# Patient Record
Sex: MALE | Race: WHITE | NOT HISPANIC OR LATINO | Employment: OTHER | URBAN - METROPOLITAN AREA
[De-identification: names, ages, dates, MRNs, and addresses within clinical notes are randomized per-mention and may not be internally consistent; named-entity substitution may affect disease eponyms.]

---

## 2017-01-17 ENCOUNTER — OFFICE VISIT (OUTPATIENT)
Dept: PAIN MEDICINE | Facility: CLINIC | Age: 42
End: 2017-01-17

## 2017-01-17 VITALS
HEART RATE: 89 BPM | DIASTOLIC BLOOD PRESSURE: 98 MMHG | SYSTOLIC BLOOD PRESSURE: 144 MMHG | HEIGHT: 69 IN | RESPIRATION RATE: 18 BRPM | TEMPERATURE: 97.7 F | WEIGHT: 236.4 LBS | OXYGEN SATURATION: 96 % | BODY MASS INDEX: 35.01 KG/M2

## 2017-01-17 DIAGNOSIS — G89.29 CHRONIC BILATERAL LOW BACK PAIN WITH LEFT-SIDED SCIATICA: Primary | ICD-10-CM

## 2017-01-17 DIAGNOSIS — M51.36 DDD (DEGENERATIVE DISC DISEASE), LUMBAR: ICD-10-CM

## 2017-01-17 DIAGNOSIS — M54.42 CHRONIC BILATERAL LOW BACK PAIN WITH LEFT-SIDED SCIATICA: Primary | ICD-10-CM

## 2017-01-17 PROCEDURE — 99213 OFFICE O/P EST LOW 20 MIN: CPT | Performed by: PAIN MEDICINE

## 2017-01-17 RX ORDER — HYDROCODONE BITARTRATE AND ACETAMINOPHEN 7.5; 325 MG/1; MG/1
1 TABLET ORAL 2 TIMES DAILY PRN
Qty: 60 TABLET | Refills: 0 | Status: SHIPPED | OUTPATIENT
Start: 2017-01-17 | End: 2017-03-17 | Stop reason: SDUPTHER

## 2017-01-17 RX ORDER — HYDROCODONE BITARTRATE AND ACETAMINOPHEN 7.5; 325 MG/1; MG/1
1 TABLET ORAL 2 TIMES DAILY PRN
Qty: 60 TABLET | Refills: 0 | Status: SHIPPED | OUTPATIENT
Start: 2017-01-17 | End: 2017-03-17

## 2017-01-17 RX ORDER — ROFLUMILAST 500 UG/1
500 TABLET ORAL DAILY
COMMUNITY
End: 2017-05-16

## 2017-01-17 NOTE — MR AVS SNAPSHOT
Jono Garza   1/17/2017 3:20 PM   Office Visit    Dept Phone:  [phone]   Encounter #:  [id number]    Provider:  Chante Duarte MD   Department:  [facility]IN [facility]NAG[facility]                Your Full Care Plan              Today's Medication Changes          These changes are accurate as of: 1/17/17  4:15 PM.  If you have any questions, ask your nurse or doctor.               New Medication(s)Ordered:     * HYDROcodone-acetaminophen 7.5-325 MG per tablet   Commonly known as:  NORCO   Take 1 tablet by mouth 2 (Two) Times a Day As Needed (pain).   Replaces:  HYDROcodone-acetaminophen 5-325 MG per tablet   Started by:  Chante Duarte MD       * HYDROcodone-acetaminophen 7.5-325 MG per tablet   Commonly known as:  NORCO   Take 1 tablet by mouth 2 (Two) Times a Day As Needed (pain).   Started by:  Chante Duarte MD       * Notice:  This list has 2 medication(s) that are the same as other medications prescribed for you. Read the directions carefully, and ask your doctor or other care provider to review them with you.      Stop taking medication(s)listed here:     HYDROcodone-acetaminophen 5-325 MG per tablet   Commonly known as:  NORCO   Replaced by:  HYDROcodone-acetaminophen 7.5-325 MG per tablet   Stopped by:  Chante Duarte MD           ibuprofen 600 MG tablet   Commonly known as:  ADVIL,MOTRIN   Stopped by:  Chante Duarte MD                Where to Get Your Medications      You can get these medications from any pharmacy     Bring a paper prescription for each of these medications     HYDROcodone-acetaminophen 7.5-325 MG per tablet    HYDROcodone-acetaminophen 7.5-325 MG per tablet                  Your Updated Medication List          This list is accurate as of: 1/17/17  4:15 PM.  Always use your most recent med list.                alendronate 5 MG tablet   Commonly known as:  FOSAMAX       amitriptyline 50 MG tablet      Commonly known as:  ELAVIL       budesonide-formoterol 160-4.5 MCG/ACT inhaler   Commonly known as:  SYMBICORT       diclofenac-misoprostol 50-0.2 MG EC tablet   Commonly known as:  ARTHROTEC 50   Take 1 tablet by mouth 2 (Two) Times a Day.       gabapentin 600 MG tablet   Commonly known as:  NEURONTIN       * HYDROcodone-acetaminophen 7.5-325 MG per tablet   Commonly known as:  NORCO   Take 1 tablet by mouth 2 (Two) Times a Day As Needed (pain).       * HYDROcodone-acetaminophen 7.5-325 MG per tablet   Commonly known as:  NORCO   Take 1 tablet by mouth 2 (Two) Times a Day As Needed (pain).       Loratadine 10 MG capsule       meloxicam 15 MG tablet   Commonly known as:  MOBIC       pramipexole 0.125 MG tablet   Commonly known as:  MIRAPEX       roflumilast 500 MCG tablet tablet   Commonly known as:  DALIRESP       SPIRIVA RESPIMAT IN       tapentadol 50 MG tablet   Commonly known as:  NUCYNTA   Take 1 tablet by mouth 2 (Two) Times a Day As Needed (pain).       VITAMIN D2 PO       * Notice:  This list has 2 medication(s) that are the same as other medications prescribed for you. Read the directions carefully, and ask your doctor or other care provider to review them with you.            You Were Diagnosed With        Codes Comments    Chronic bilateral low back pain with left-sided sciatica    -  Primary ICD-10-CM: M54.42, G89.29  ICD-9-CM: 724.2, 724.3, 338.29     DDD (degenerative disc disease), lumbar     ICD-10-CM: M51.36  ICD-9-CM: 722.52       Instructions     None    Patient Instructions History      Upcoming Appointments     Visit Type Date Time Department    OFFICE VISIT 1/17/2017  3:20 PM [facility]    NEW PATIENT 1/20/2017 10:00 AM [facility]      [employer] Signup     Our records indicate that you have an active [employer] account.    You can view your After Visit Summary by going to [email or web address] and logging in with your [employer] username and password.  If you don't  "have a [employer] username and password but a parent or guardian has access to your record, the parent or guardian should login with their own [employer] username and password and access your record to view the After Visit Summary.    If you have questions, you can email [email or web address] or call [phone] to talk to our [employer] staff.  Remember, [employer] is NOT to be used for urgent needs.  For medical emergencies, dial 911.               Other Info from Your Visit           Your Appointments     Jan 20, 2017 10:00 AM EST   New Patient with Juan Hopper MD   [facility] MEDICAL [facility] (--)    [address] [place] [address]   [phone]           Bring all previous medical records and films, along with current medications and insurance information.              Allergies     Acetaminophen      Penicillins        Reason for Visit     Back Pain           Vital Signs     Blood Pressure Pulse Temperature Respirations Height Weight    144/98 89 97.7 °F (36.5 °C) 18 69\" (175.3 cm) 236 lb 6.4 oz (107 kg)    Oxygen Saturation Body Mass Index Smoking Status             96% 34.91 kg/m2 Current Every Day Smoker         Problems and Diagnoses Noted     Chronic bilateral low back pain with left-sided sciatica    Degeneration of lumbar or lumbosacral intervertebral disc        "

## 2017-01-17 NOTE — PROGRESS NOTES
CHIEF COMPLAINT: Back Pain    Back Pain   This is a chronic problem. The current episode started more than 1 year ago. The problem occurs constantly. The problem has been waxing and waning since onset. The pain is present in the gluteal, lumbar spine and sacro-iliac. The quality of the pain is described as aching, burning, shooting and stabbing. The pain radiates to the left knee, left thigh, right knee and right thigh. The pain is at a severity of 7/10. The pain is worse during the day. The symptoms are aggravated by bending, position, sitting, standing and twisting. Stiffness is present all day. Associated symptoms include leg pain, paresthesias, tingling and weakness. Pertinent negatives include no abdominal pain, chest pain, headaches, perianal numbness or weight loss. Risk factors include history of osteoporosis and recent trauma.     Jono Garza is a 41 y.o. male.  He is here to follow up for Back Pain    Since last visit their pain has remain unchanged.  Patient has been released to the pain [place] for missing multiple pill counts.  New patient UDS was consistent so Nucynta was prescribed for the patient at last visit.  But even after a pills it was not approved by his insurance.  Hydrocodone 5/325 mg twice a day was started.  This is a significant decrease from his previous pain regimen of hydrocodone 10/325 mg 4 times a day.  States the medication has helped his pain and has helped him the, more functional and perform more daily activities, but pain is still present.    The patient states their pain is a 6 on a scale of 1-10.  The patient describes this pain as constant dull, ache, sharp, shooting, stabbing and throbbing.  The pain is located in Low Back and radiates to left leg. This painful problem is aggravated by physical activity, twisting, bending and lifting and is alleviated by relaxation, pain medication and heating pad.    DVD on SCS given at last apt but hasn't watched yet.     Past  pain medications: Hydrocodone 5/325 and 7.5/325 QID - not very effective  Hydrocodone 10/325 mg qid - effective - decrease pain level down to a 2-3. Able to be more active, perform more daily activities.   Mobic 15 mg daily - helping    Current pain medications: hydro/apap 5-325 mg BID  diclofenac-misoprostol 50-0.2 mg bid prn - only taken for 4-5 days- minimal help but doesn't hurt stomach when taking.   Gabapentin 600 mg TID - helping  Elavil 50 mg qhs - helping him sleep    Past therapies:  Physical Therapy: yes - last visit was over 1 year ago.   Chiropractor: yes  Massage Therapy: no  TENS: yes - used at PT and chiropractor  Neck or back surgery: no    Previous Injections: yes, in his lower back at [facility]- LESI x 3 - minimal benefit.   Effect of Injection (%): 0%, he said if it had any affect it did not last long.    PEG Assessment   What number best describes your pain on average in the past week? 6  What number best describes how, during the past week, pain has interfered with your enjoyment of life? 7  What number best describes how, during the past week, pain has interfered with your general activity? 8      Current Outpatient Prescriptions:   •  alendronate (FOSAMAX) 5 MG tablet, Take 5 mg by mouth Every Morning Before Breakfast., Disp: , Rfl:   •  amitriptyline (ELAVIL) 50 MG tablet, Take 50 mg by mouth Every Night., Disp: , Rfl:   •  budesonide-formoterol (SYMBICORT) 160-4.5 MCG/ACT inhaler, Inhale 1 puff 2 (Two) Times a Day., Disp: , Rfl:   •  diclofenac-misoprostol (ARTHROTEC 50) 50-0.2 MG EC tablet, Take 1 tablet by mouth 2 (Two) Times a Day., Disp: 60 tablet, Rfl: 3  •  Ergocalciferol (VITAMIN D2 PO), Take 125 mg by mouth., Disp: , Rfl:   •  gabapentin (NEURONTIN) 600 MG tablet, Take 600 mg by mouth 3 (Three) Times a Day., Disp: , Rfl:   •  Loratadine 10 MG capsule, Take  by mouth., Disp: , Rfl:   •  meloxicam (MOBIC) 15 MG tablet, Take 15 mg by mouth Daily., Disp: , Rfl:   •   pramipexole (MIRAPEX) 0.125 MG tablet, Take 0.125 mg by mouth 3 (Three) Times a Day., Disp: , Rfl:   •  roflumilast (DALIRESP) 500 MCG tablet tablet, Take 500 mcg by mouth Daily., Disp: , Rfl:   •  Tiotropium Bromide Monohydrate (SPIRIVA RESPIMAT IN), Inhale 1 puff., Disp: , Rfl:   •  tapentadol (NUCYNTA) 50 MG tablet, Take 1 tablet by mouth 2 (Two) Times a Day As Needed (pain)., Disp: 60 tablet, Rfl: 0    IMAGING  MRI femur 7/27/16  IMPRESSION: Normal MRI of the thigh and femur. Incidental benign lesion noted in the distal femoral shaft as detailed above.       MRI lumbar spine 3/15/16 - in [place] - no results posted - patient states he had it performed, will try to obtain.  Summary from the [facility] note the lumbar MRI showed:  1. Old superior L1 vertebral body compression fracture no associated retropulsion of vertebral body  2. Mild T12-L1 disc degeneration.  3. Mild L5-S1 disc degeneration producing mild right-sided stenosis      Mri 12/2014:  COMMENT: MRI of the lumbar spine performed without contrast using  routine 1.5T imaging technique. The outside CT scan from 03/28/2014 has  been acquired for comparison.  Sagittal alignment is normal. Chronic mild anterior wedging is seen at  L1 and to a lesser extent at T12 with large Schmorl's nodes at the  superior endplates. The conus medullaris terminates at L1-2 and is  within normal limits. There is mild intervertebral disk desiccation at  T11-12, T12-L1 and L5-S1 with mild associated loss of intervertebral  disk height. There is a small posterior annular fissure at the 5-1  level. There is some marrow signal abnormality seen at the right side  of the sacrum S1 level. This corresponds to the site of the fracture on  the study from 03/28/2014 and is probably related to fracture healing.  Please refer to separate MRI pelvis report. CT scanning is more  sensitive than MRI for discrete fracture line. Otherwise, marrow signal  intensity is  unremarkable.  At T12-L1, there is mild concentric disk bulge with mild effacement of  the anterior thecal sac. There is no foraminal impingement.  At L1-2, there is no significant abnormality. No canal or foraminal  impingement.  At L2-3, no significant abnormality.  At L3-4, mild facet hypertrophy. No canal or foraminal compromise.  At L4-5, mild facet degenerative change bilaterally, left more so than  right. No canal stenosis or foraminal impingement.  At L5-S1, mild facet degenerative change bilaterally. Mild broad-based  posterior disk bulge with a superimposed extrusion in a right paramedian  location extending slightly caudad from the level of the disk resulting  contiguous with it resulting in mild mass effect on the right lateral  recess, expected location of the right S1 root. There is however no  significant central canal stenosis. There is mild right-sided foraminal  narrowing and only minimal left foraminal narrowing.  IMPRESSION-  1. There is some marrow edema in the right sacral ala at site of  previously diagnosed fracture. This is probably related to fracture  healing, but please correlate with the pelvic MRI and please be aware  that CT scanning is more sensitive than MRI for assessment of the  fracture line itself.  2. At the L5-S1 level, there is a rightward disk extrusion with some  mild mass effect on the right lateral recess expected location of the  right S1 root, but there is no central canal stenosis. Please refer to  the multiple level description and level-by-level discussion above.  3. Chronic anterior wedging at T12 and L1. No acute compression  Fracture.      Lumbar x-rays with flexion extension views 3/31/2016:  1. Stable chronic mild superior L1 vertebral body compression deformity, no acute fracture   2. New amount posterior L5 disc narrowing likely due to early degenerative change  3. Diffuse osteopenia greater than expected for age  4. Decreased range of motion      EMG on  "3/31/2016:  1. Normal study    The following portions of the patient's history were reviewed and updated as appropriate: allergies, current medications, past family history, past medical history, past social history, past surgical history and problem list.    Review of Systems   Constitutional: Negative for fatigue and weight loss.   HENT: Positive for congestion. Negative for tinnitus.    Eyes: Negative for visual disturbance.   Respiratory: Positive for cough, shortness of breath and wheezing.    Cardiovascular: Negative for chest pain.   Gastrointestinal: Negative for abdominal pain, constipation and diarrhea.   Musculoskeletal: Positive for back pain.   Skin: Negative for rash and wound.   Allergic/Immunologic: Negative for immunocompromised state.   Neurological: Positive for tingling, weakness and paresthesias. Negative for headaches.   Hematological: Does not bruise/bleed easily.   Psychiatric/Behavioral: Positive for decreased concentration, dysphoric mood and sleep disturbance. Negative for suicidal ideas. The patient is not nervous/anxious.    All other systems reviewed and are negative.      Vitals:    01/17/17 1506   BP: 144/98   Pulse: 89   Resp: 18   Temp: 97.7 °F (36.5 °C)   SpO2: 96%   Weight: 236 lb 6.4 oz (107 kg)   Height: 69\" (175.3 cm)   PainSc:   6   PainLoc: Back       Physical Exam   Constitutional: He is oriented to person, place, and time. He appears well-developed and well-nourished. No distress.   HENT:   Head: Normocephalic and atraumatic.   Nose: Nose normal.   Mouth/Throat: Oropharynx is clear and moist.   Eyes: Conjunctivae and EOM are normal.   Neck: Normal range of motion. Neck supple.   Pulmonary/Chest: Effort normal. No stridor. No respiratory distress.   Musculoskeletal:        Right hip: He exhibits decreased range of motion, decreased strength and tenderness.        Left hip: He exhibits decreased range of motion, decreased strength and tenderness.        Lumbar back: He " exhibits decreased range of motion, tenderness, pain and spasm.   +bilateral lumbar facet tenderness  +bilateral lumbar facet loading   Patient is hyperalgesic over lumbar and pelvic region.  Extremely sensitive to light touch making it difficult to examine.  Out of proportion to exam.    Neurological: He is alert and oriented to person, place, and time. He has normal strength. No cranial nerve deficit or sensory deficit. Gait normal.   Skin: Skin is warm and dry. No rash noted. He is not diaphoretic.   Psychiatric: He has a normal mood and affect. His speech is normal and behavior is normal.   Nursing note and vitals reviewed.    Ortho Exam  Neurologic Exam     Mental Status   Oriented to person, place, and time.   Speech: speech is normal     Cranial Nerves   Cranial nerves II through XII intact.     CN III, IV, VI   Extraocular motions are normal.     Motor Exam     Strength   Strength 5/5 throughout.     Gait, Coordination, and Reflexes     Gait  Gait: normal      Pain Management Panel     Pain Management Panel Latest Ref Rng 11/29/2016    AMPHETAMINES SCREEN, URINE Negative Negative    BARBITURATES SCREEN Negative Negative    BENZODIAZEPINE SCREEN, URINE Negative Negative    COCAINE SCREEN, URINE Negative Negative    METHADONE SCREEN, URINE Negative Negative        Last UDS: 11/29/2016  Comments: consistent- +gabapentin +elavil-          Date of last LUISA reviewed : 01/17/17   Comments: Consistent     Assessment/Plan   Jono was seen today for back pain.    Diagnoses and all orders for this visit:    Chronic bilateral low back pain with left-sided sciatica    DDD (degenerative disc disease), lumbar    Requested Prescriptions     Signed Prescriptions Disp Refills   • HYDROcodone-acetaminophen (NORCO) 7.5-325 MG per tablet 60 tablet 0     Sig: Take 1 tablet by mouth 2 (Two) Times a Day As Needed (pain).   • HYDROcodone-acetaminophen (NORCO) 7.5-325 MG per tablet 60 tablet 0     Sig: Take 1 tablet by mouth  "2 (Two) Times a Day As Needed (pain).     -  Random urine drug screen per office policy AT NEXT VISIT.  -Insurance did not approve Nucynta.- \"patient needs to try and/or fail or have side affects of oxycodone, oxycodone-acetaminophen, tramadol, or hydromorphone\"  - Given the patient had been dismissed from the pain [place] I was trying to give her less abuse narcotic.    - We'll continue to closely monitor the patient for any abuse or misuse.  - DVD given to the patient about SCS at last visit.  Encouraged to watch.  Patient states he has a lot going on socially in his life at this time that he cannot consider this in the next few months.  We'll address in the future.  - Referral to orthospine placed 11/29/2016 for further evaluation given continued severe low back and pelvic pain out of proportion to exam.  The patient has never been evaluated by a surgeon in the past.  His only diagnosis is continued pain after multiple pelvic fractures.  His pain should've continued to heal over the years but given his extreme pain should be evaluated to make sure there is not anything concerning from a surgical aspects.  - Will check on ortho spine referral status.   - continue diclofenac-misoprostol 50-0.2 mg. No refills needed today.    - Continue gabapentin 600 mg tid - helping, No refills needed today.   - Continue elavil 50 mg qhs - helping, no refills needed today.   - Will not restart his high dose narcotics as they were ineffective and given his age and chronicity of his disease I would like to keep his narcotics minimal.   - Will increase his hydrocodone from 5 mg to 7.5-325 mg twice a day when necessary pain.  We will increase for 2 months only with plan to decrease back to 5/325 twice a day when necessary after 2 months.  However the patient remain on minimal narcotics possible giving his young age and ineffectiveness at high doses in the past.  - Patient instructed regarding side effects and the need to avoid " driving until they know how the medication changes affect them.     - Two months prescriptions given today. Appropriate DNF dates applied.    - Continue home exercise program.      - Will be on vacation from march 24- April 1st.  We will follow up before his vacation but no early refills will be given.  Appropriate do not fill dates will be applied.    - Body mass index is 34.91 kg/(m^2). By CDC definitions, this patient is obese (BMI >= 30). Patient counseled on the importance of weight loss to help with overall health and pain control. Patient instructed to attempt weight loss.   weight loss of 6 lbs over the past 1 month(s)    Intentional?  Yes  Plan: Calorie counting  increase physical activity, reduce portion size, cut out extra servings and reduce fast food intake    Follow-up in 2 months.    LUISA REPORT  As part of the patient's treatment plan, I am prescribing controlled substances. The patient has been made aware of appropriate use of such medications, including potential risk of somnolence, limited ability to drive and/or work safely, and the potential for dependence or overdose. It has also bee made clear that these medications are for use by this patient only, without concomitant use of alcohol or other substances unless prescribed.     Patient has completed prescribing agreement detailing terms of continued prescribing of controlled substances, including monitoring LUISA reports, urine drug screening, and pill counts if necessary. The patient is aware that inappropriate use will results in cessation of prescribing such medications.    LUISA report has been reviewed and scanned into the patient's chart.    History and physical exam exhibit continued safe and appropriate use of controlled substances.       Chante Duarte MD  Pain Management

## 2017-01-18 ENCOUNTER — TELEPHONE (OUTPATIENT)
Dept: PAIN MEDICINE | Facility: CLINIC | Age: 42
End: 2017-01-18

## 2017-01-18 NOTE — TELEPHONE ENCOUNTER
Patients insurance denied Nucynta and states that patient needs to try and/or fail or have side affects of oxycodone, oxycodone-acetaminophen, tramadol, or hydromorphone

## 2017-03-17 ENCOUNTER — OFFICE VISIT (OUTPATIENT)
Dept: PAIN MEDICINE | Facility: CLINIC | Age: 42
End: 2017-03-17

## 2017-03-17 VITALS
RESPIRATION RATE: 16 BRPM | WEIGHT: 241.2 LBS | HEIGHT: 69 IN | BODY MASS INDEX: 35.73 KG/M2 | DIASTOLIC BLOOD PRESSURE: 93 MMHG | SYSTOLIC BLOOD PRESSURE: 151 MMHG | TEMPERATURE: 97.7 F | OXYGEN SATURATION: 95 % | HEART RATE: 111 BPM

## 2017-03-17 DIAGNOSIS — M54.42 CHRONIC BILATERAL LOW BACK PAIN WITH LEFT-SIDED SCIATICA: ICD-10-CM

## 2017-03-17 DIAGNOSIS — G89.29 CHRONIC BILATERAL LOW BACK PAIN WITH LEFT-SIDED SCIATICA: ICD-10-CM

## 2017-03-17 DIAGNOSIS — M51.36 DDD (DEGENERATIVE DISC DISEASE), LUMBAR: ICD-10-CM

## 2017-03-17 PROCEDURE — 99214 OFFICE O/P EST MOD 30 MIN: CPT | Performed by: PAIN MEDICINE

## 2017-03-17 RX ORDER — HYDROCODONE BITARTRATE AND ACETAMINOPHEN 7.5; 325 MG/1; MG/1
1 TABLET ORAL 3 TIMES DAILY PRN
Qty: 90 TABLET | Refills: 0 | Status: SHIPPED | OUTPATIENT
Start: 2017-03-17 | End: 2017-05-16 | Stop reason: SDUPTHER

## 2017-03-17 RX ORDER — HYDROCODONE BITARTRATE AND ACETAMINOPHEN 7.5; 325 MG/1; MG/1
1 TABLET ORAL 3 TIMES DAILY PRN
Qty: 90 TABLET | Refills: 0 | Status: SHIPPED | OUTPATIENT
Start: 2017-03-17 | End: 2017-03-17 | Stop reason: SDUPTHER

## 2017-03-17 RX ORDER — HYDROCODONE BITARTRATE AND ACETAMINOPHEN 7.5; 325 MG/1; MG/1
1 TABLET ORAL 3 TIMES DAILY PRN
Qty: 90 TABLET | Refills: 0 | Status: SHIPPED | OUTPATIENT
Start: 2017-03-17 | End: 2017-05-16

## 2017-03-17 NOTE — PROGRESS NOTES
CHIEF COMPLAINT: Back Pain    HPI  Jono Garza is a 41 y.o. male.  He is here to follow up for Back Pain    Since last visit their pain has remain unchanged. Pt state he has been trying to be more active. Increased pain with increased activity. Trying to walk his dogs more but causing increased pain.     The patient states their pain is a 5 on a scale of 1-10. The patient describes this pain as constant dull, ache, sharp, shooting, stabbing and throbbing. The pain is located in Low Back and radiates to left leg. This painful problem is aggravated by physical activity, twisting, bending and lifting and is alleviated by relaxation, pain medication and heating pad.    Past pain medications: Hydrocodone 5/325 and 7.5/325 QID - not very effective  Hydrocodone 10/325 mg qid - effective - decrease pain level down to a 2-3. Able to be more active, perform more daily activities.   Mobic 15 mg daily - helping     Current pain medications: hydro/apap 5-325 mg BID  diclofenac-misoprostol 50-0.2 mg bid prn - only taken for 4-5 days- minimal help but doesn't hurt stomach when taking.   Gabapentin 600 mg TID - helping  Elavil 50 mg qhs - helping him sleep     Past therapies:  Physical Therapy: yes - last visit was over 1 year ago.   Chiropractor: yes  Massage Therapy: no  TENS: yes - used at PT and chiropractor  Neck or back surgery: no    PEG Assessment   What number best describes your pain on average in the past week? 5  What number best describes how, during the past week, pain has interfered with your enjoyment of life? 4  What number best describes how, during the past week, pain has interfered with your general activity? 4      Current Outpatient Prescriptions:   •  alendronate (FOSAMAX) 5 MG tablet, Take 5 mg by mouth Every Morning Before Breakfast., Disp: , Rfl:   •  amitriptyline (ELAVIL) 50 MG tablet, Take 50 mg by mouth Every Night., Disp: , Rfl:   •  budesonide-formoterol (SYMBICORT) 160-4.5 MCG/ACT  inhaler, Inhale 1 puff 2 (Two) Times a Day., Disp: , Rfl:   •  diclofenac-misoprostol (ARTHROTEC 50) 50-0.2 MG EC tablet, Take 1 tablet by mouth 2 (Two) Times a Day., Disp: 60 tablet, Rfl: 3  •  Ergocalciferol (VITAMIN D2 PO), Take 125 mg by mouth., Disp: , Rfl:   •  gabapentin (NEURONTIN) 600 MG tablet, Take 600 mg by mouth 3 (Three) Times a Day., Disp: , Rfl:   •  HYDROcodone-acetaminophen (NORCO) 7.5-325 MG per tablet, Take 1 tablet by mouth 3 (Three) Times a Day As Needed (pain)., Disp: 90 tablet, Rfl: 0  •  Loratadine 10 MG capsule, Take  by mouth., Disp: , Rfl:   •  meloxicam (MOBIC) 15 MG tablet, Take 15 mg by mouth Daily., Disp: , Rfl:   •  pramipexole (MIRAPEX) 0.125 MG tablet, Take 0.125 mg by mouth 3 (Three) Times a Day., Disp: , Rfl:   •  roflumilast (DALIRESP) 500 MCG tablet tablet, Take 500 mcg by mouth Daily., Disp: , Rfl:   •  tapentadol (NUCYNTA) 50 MG tablet, Take 1 tablet by mouth 2 (Two) Times a Day As Needed (pain)., Disp: 60 tablet, Rfl: 0  •  theophylline (STEPHANIA-24) 300 MG 24 hr capsule, Take 150 mg by mouth 2 (Two) Times a Day., Disp: , Rfl:   •  Tiotropium Bromide Monohydrate (SPIRIVA RESPIMAT IN), Inhale 1 puff., Disp: , Rfl:   •  HYDROcodone-acetaminophen (NORCO) 7.5-325 MG per tablet, Take 1 tablet by mouth 3 (Three) Times a Day As Needed (pain)., Disp: 90 tablet, Rfl: 0    IMAGING  MRI femur 7/27/16  IMPRESSION: Normal MRI of the thigh and femur. Incidental benign lesion noted in the distal femoral shaft as detailed above.       MRI lumbar spine 3/15/16 - in [place] - no results posted - patient states he had it performed, will try to obtain.  Summary from the [facility] note the lumbar MRI showed:  1. Old superior L1 vertebral body compression fracture no associated retropulsion of vertebral body  2. Mild T12-L1 disc degeneration.  3. Mild L5-S1 disc degeneration producing mild right-sided stenosis      Mri 12/2014:  COMMENT: MRI of the lumbar spine performed without  contrast using  routine 1.5T imaging technique. The outside CT scan from 03/28/2014 has  been acquired for comparison.  Sagittal alignment is normal. Chronic mild anterior wedging is seen at  L1 and to a lesser extent at T12 with large Schmorl's nodes at the  superior endplates. The conus medullaris terminates at L1-2 and is  within normal limits. There is mild intervertebral disk desiccation at  T11-12, T12-L1 and L5-S1 with mild associated loss of intervertebral  disk height. There is a small posterior annular fissure at the 5-1  level. There is some marrow signal abnormality seen at the right side  of the sacrum S1 level. This corresponds to the site of the fracture on  the study from 03/28/2014 and is probably related to fracture healing.  Please refer to separate MRI pelvis report. CT scanning is more  sensitive than MRI for discrete fracture line. Otherwise, marrow signal  intensity is unremarkable.  At T12-L1, there is mild concentric disk bulge with mild effacement of  the anterior thecal sac. There is no foraminal impingement.  At L1-2, there is no significant abnormality. No canal or foraminal  impingement.  At L2-3, no significant abnormality.  At L3-4, mild facet hypertrophy. No canal or foraminal compromise.  At L4-5, mild facet degenerative change bilaterally, left more so than  right. No canal stenosis or foraminal impingement.  At L5-S1, mild facet degenerative change bilaterally. Mild broad-based  posterior disk bulge with a superimposed extrusion in a right paramedian  location extending slightly caudad from the level of the disk resulting  contiguous with it resulting in mild mass effect on the right lateral  recess, expected location of the right S1 root. There is however no  significant central canal stenosis. There is mild right-sided foraminal  narrowing and only minimal left foraminal narrowing.  IMPRESSION-  1. There is some marrow edema in the right sacral ala at site of  previously  diagnosed fracture. This is probably related to fracture  healing, but please correlate with the pelvic MRI and please be aware  that CT scanning is more sensitive than MRI for assessment of the  fracture line itself.  2. At the L5-S1 level, there is a rightward disk extrusion with some  mild mass effect on the right lateral recess expected location of the  right S1 root, but there is no central canal stenosis. Please refer to  the multiple level description and level-by-level discussion above.  3. Chronic anterior wedging at T12 and L1. No acute compression  Fracture.      Lumbar x-rays with flexion extension views 3/31/2016:  1. Stable chronic mild superior L1 vertebral body compression deformity, no acute fracture   2. New amount posterior L5 disc narrowing likely due to early degenerative change  3. Diffuse osteopenia greater than expected for age  4. Decreased range of motion      EMG on 3/31/2016:  1. Normal study    PFSH:  The following portions of the patient's history were reviewed and updated as appropriate: problem list, past medical history, past surgery history, social history, family history, medications, and allergies    Review of Systems   Constitutional: Positive for activity change (increased). Negative for fever.   Respiratory: Positive for cough. Negative for shortness of breath.    Cardiovascular: Negative for chest pain.   Gastrointestinal: Negative for abdominal pain, constipation, diarrhea, nausea and vomiting.   Genitourinary: Negative for difficulty urinating and dysuria.   Musculoskeletal: Positive for back pain.   Neurological: Positive for weakness and numbness. Negative for dizziness, light-headedness and headaches.   Psychiatric/Behavioral: Positive for sleep disturbance. Negative for agitation, confusion, hallucinations and suicidal ideas.   All other systems reviewed and are negative.      Vitals:    03/17/17 0809   BP: 151/93   Pulse: 111   Resp: 16   Temp: 97.7 °F (36.5 °C)   SpO2:  "95%   Weight: 241 lb 3.2 oz (109 kg)   Height: 69\" (175.3 cm)   PainSc:   5   PainLoc: Back       Physical Exam   Constitutional: He is oriented to person, place, and time. He appears well-developed and well-nourished. No distress.   HENT:   Head: Normocephalic and atraumatic.   Nose: Nose normal.   Mouth/Throat: Oropharynx is clear and moist.   Eyes: Conjunctivae and EOM are normal.   Neck: Normal range of motion. Neck supple.   Pulmonary/Chest: Effort normal. No stridor. No respiratory distress.   Musculoskeletal:        Right hip: He exhibits decreased range of motion, decreased strength and tenderness.        Left hip: He exhibits decreased range of motion, decreased strength and tenderness.        Lumbar back: He exhibits decreased range of motion, tenderness, pain and spasm.   +bilateral lumbar facet tenderness  +bilateral lumbar facet loading   Patient is hyperalgesic over lumbar and pelvic region.  Extremely sensitive to light touch making it difficult to examine.  Out of proportion to exam.    Neurological: He is alert and oriented to person, place, and time. He has normal strength. No cranial nerve deficit or sensory deficit.   Skin: Skin is warm and dry. No rash noted. He is not diaphoretic.   Psychiatric: He has a normal mood and affect. His speech is normal and behavior is normal.   Nursing note and vitals reviewed.    Ortho Exam  Neurologic Exam     Mental Status   Oriented to person, place, and time.   Speech: speech is normal     Cranial Nerves     CN III, IV, VI   Extraocular motions are normal.     Motor Exam     Strength   Strength 5/5 throughout.       Lab Results   Component Value Date    POCMETH Negative 11/29/2016    POCAMPHET Negative 11/29/2016    POCBARBITUR Negative 11/29/2016    POCBENZO Negative 11/29/2016    POCCOCAINE Negative 11/29/2016    POCMETHADO Negative 11/29/2016    POCOPIATES Negative 11/29/2016    POCOXYCODO Negative 11/29/2016    POCPHENCYC Negative 11/29/2016    POCPROPOXY " Negative 11/29/2016    POCTHC Negative 11/29/2016    POCTRICYC Positive (A) 11/29/2016     Last UDS results reviewed: 03/17/17   Last UDS: 11/29/2016  Comments: consistent- +gabapentin +elavil-       Date of last LUISA reviewed : 03/17/17   Comments: Consistent     Assessment/Plan   Jono was seen today for back pain.    Diagnoses and all orders for this visit:    Chronic bilateral low back pain with left-sided sciatica  -     Discontinue: HYDROcodone-acetaminophen (NORCO) 7.5-325 MG per tablet; Take 1 tablet by mouth 3 (Three) Times a Day As Needed (pain).  -     HYDROcodone-acetaminophen (NORCO) 7.5-325 MG per tablet; Take 1 tablet by mouth 3 (Three) Times a Day As Needed (pain).  -     HYDROcodone-acetaminophen (NORCO) 7.5-325 MG per tablet; Take 1 tablet by mouth 3 (Three) Times a Day As Needed (pain).  -     Oral Fluid Drug Screen; Future  -     Cancel: Oral Fluid Drug Screen; Future    DDD (degenerative disc disease), lumbar  -     Discontinue: HYDROcodone-acetaminophen (NORCO) 7.5-325 MG per tablet; Take 1 tablet by mouth 3 (Three) Times a Day As Needed (pain).  -     HYDROcodone-acetaminophen (NORCO) 7.5-325 MG per tablet; Take 1 tablet by mouth 3 (Three) Times a Day As Needed (pain).  -     HYDROcodone-acetaminophen (NORCO) 7.5-325 MG per tablet; Take 1 tablet by mouth 3 (Three) Times a Day As Needed (pain).  -     Oral Fluid Drug Screen; Future  -     Cancel: Oral Fluid Drug Screen; Future    Requested Prescriptions     Signed Prescriptions Disp Refills   • HYDROcodone-acetaminophen (NORCO) 7.5-325 MG per tablet 90 tablet 0     Sig: Take 1 tablet by mouth 3 (Three) Times a Day As Needed (pain).   • HYDROcodone-acetaminophen (NORCO) 7.5-325 MG per tablet 90 tablet 0     Sig: Take 1 tablet by mouth 3 (Three) Times a Day As Needed (pain).     - UDS done today, will check on next visit.   - INCREASE norco from 7.5/325 mg bid to tid prn pain. (#90, 1 refills)   - Two months prescriptions given today.  "Appropriate DNF dates applied.    - Insurance did not approve Nucynta.- \"patient needs to try and/or fail or have side affects of oxycodone, oxycodone-acetaminophen, tramadol, or hydromorphone\"  - Given the patient had been dismissed from the pain [facility] I was trying to give her less abuse narcotic. We'll continue to closely monitor the patient for any abuse or misuse.  - DVD given to the patient about SCS at last visit. We'll address in the future.  - Referral to orthospine placed 11/29/2016 for further evaluation given continued severe low back and pelvic pain out of proportion to exam.  The patient has never been evaluated by a surgeon in the past. His only diagnosis is continued pain after multiple pelvic fractures. His pain should've continued to heal over the years but given his extreme pain should be evaluated to make sure there is not anything concerning from a surgical aspects.  - Will check on ortho spine referral status.   - continue diclofenac-misoprostol 50-0.2 mg. No refills needed today.   - Continue gabapentin 600 mg tid - helping, No refills needed today.   - Continue elavil 50 mg qhs - helping, no refills needed today.   - Will not restart his high dose narcotics as they were ineffective and given his age and chronicity of his disease I would like to keep his narcotics minimal.   - Patient instructed regarding side effects and the need to avoid driving until they know how the medication changes affect them.     - Continue home exercise program.       Wt Readings from Last 3 Encounters:   03/17/17 241 lb 3.2 oz (109 kg)   01/17/17 236 lb 6.4 oz (107 kg)   12/14/16 242 lb (110 kg)     Body mass index is 35.62 kg/(m^2). Patient counseled on the importance of weight loss to help with overall health and pain control. Patient instructed to attempt weight loss.  Plan: Calorie counting  reduce portion size, cut out extra servings and reduce fast food intake Increase walking dog, more frequently and " longer walks.     Follow-up in 2 months.    LUISA REPORT  As part of the patient's treatment plan, I am prescribing controlled substances. The patient has been made aware of appropriate use of such medications, including potential risk of somnolence, limited ability to drive and/or work safely, and the potential for dependence or overdose. It has also bee made clear that these medications are for use by this patient only, without concomitant use of alcohol or other substances unless prescribed.     Patient has completed prescribing agreement detailing terms of continued prescribing of controlled substances, including monitoring LUISA reports, urine drug screening, and pill counts if necessary. The patient is aware that inappropriate use will results in cessation of prescribing such medications.    LUISA report has been reviewed and scanned into the patient's chart.    History and physical exam exhibit continued safe and appropriate use of controlled substances.     Chante Duarte MD  Pain Management

## 2017-05-06 DIAGNOSIS — K31.89 STOMACH IRRITATION: ICD-10-CM

## 2017-05-06 DIAGNOSIS — G89.29 CHRONIC BILATERAL LOW BACK PAIN WITH LEFT-SIDED SCIATICA: ICD-10-CM

## 2017-05-06 DIAGNOSIS — M54.42 CHRONIC BILATERAL LOW BACK PAIN WITH LEFT-SIDED SCIATICA: ICD-10-CM

## 2017-05-06 DIAGNOSIS — M51.36 DDD (DEGENERATIVE DISC DISEASE), LUMBAR: ICD-10-CM

## 2017-05-08 RX ORDER — DICLOFENAC SODIUM AND MISOPROSTOL 50; 200 MG/1; UG/1
TABLET, DELAYED RELEASE ORAL
Qty: 60 TABLET | Refills: 2 | Status: SHIPPED | OUTPATIENT
Start: 2017-05-08 | End: 2017-08-29 | Stop reason: SDUPTHER

## 2017-05-16 ENCOUNTER — OFFICE VISIT (OUTPATIENT)
Dept: PAIN MEDICINE | Facility: CLINIC | Age: 42
End: 2017-05-16

## 2017-05-16 VITALS
SYSTOLIC BLOOD PRESSURE: 122 MMHG | DIASTOLIC BLOOD PRESSURE: 82 MMHG | TEMPERATURE: 97.8 F | OXYGEN SATURATION: 96 % | RESPIRATION RATE: 18 BRPM | HEIGHT: 69 IN | HEART RATE: 96 BPM

## 2017-05-16 DIAGNOSIS — M54.42 CHRONIC BILATERAL LOW BACK PAIN WITH LEFT-SIDED SCIATICA: ICD-10-CM

## 2017-05-16 DIAGNOSIS — M54.41 CHRONIC BILATERAL LOW BACK PAIN WITH BILATERAL SCIATICA: ICD-10-CM

## 2017-05-16 DIAGNOSIS — M51.36 DDD (DEGENERATIVE DISC DISEASE), LUMBAR: ICD-10-CM

## 2017-05-16 DIAGNOSIS — G89.29 CHRONIC BILATERAL LOW BACK PAIN WITH BILATERAL SCIATICA: ICD-10-CM

## 2017-05-16 DIAGNOSIS — G89.29 CHRONIC BILATERAL LOW BACK PAIN WITH LEFT-SIDED SCIATICA: ICD-10-CM

## 2017-05-16 DIAGNOSIS — M54.42 CHRONIC BILATERAL LOW BACK PAIN WITH BILATERAL SCIATICA: ICD-10-CM

## 2017-05-16 PROCEDURE — 99213 OFFICE O/P EST LOW 20 MIN: CPT | Performed by: PAIN MEDICINE

## 2017-05-16 RX ORDER — HYDROCODONE BITARTRATE AND ACETAMINOPHEN 7.5; 325 MG/1; MG/1
1 TABLET ORAL 3 TIMES DAILY PRN
Qty: 90 TABLET | Refills: 0 | Status: SHIPPED | OUTPATIENT
Start: 2017-05-16 | End: 2017-07-14 | Stop reason: SDUPTHER

## 2017-05-18 PROBLEM — M43.06 LUMBAR SPONDYLOLYSIS: Status: ACTIVE | Noted: 2017-05-18

## 2017-07-14 ENCOUNTER — OFFICE VISIT (OUTPATIENT)
Dept: PAIN MEDICINE | Facility: CLINIC | Age: 42
End: 2017-07-14

## 2017-07-14 VITALS
HEIGHT: 69 IN | WEIGHT: 226.8 LBS | BODY MASS INDEX: 33.59 KG/M2 | SYSTOLIC BLOOD PRESSURE: 136 MMHG | DIASTOLIC BLOOD PRESSURE: 85 MMHG | OXYGEN SATURATION: 97 % | TEMPERATURE: 97.7 F | HEART RATE: 107 BPM | RESPIRATION RATE: 16 BRPM

## 2017-07-14 DIAGNOSIS — M54.41 CHRONIC BILATERAL LOW BACK PAIN WITH BILATERAL SCIATICA: ICD-10-CM

## 2017-07-14 DIAGNOSIS — M51.36 DDD (DEGENERATIVE DISC DISEASE), LUMBAR: ICD-10-CM

## 2017-07-14 DIAGNOSIS — G89.29 CHRONIC BILATERAL LOW BACK PAIN WITH LEFT-SIDED SCIATICA: Primary | ICD-10-CM

## 2017-07-14 DIAGNOSIS — M54.42 CHRONIC BILATERAL LOW BACK PAIN WITH BILATERAL SCIATICA: ICD-10-CM

## 2017-07-14 DIAGNOSIS — G89.29 CHRONIC BILATERAL LOW BACK PAIN WITH BILATERAL SCIATICA: ICD-10-CM

## 2017-07-14 DIAGNOSIS — M54.42 CHRONIC BILATERAL LOW BACK PAIN WITH LEFT-SIDED SCIATICA: Primary | ICD-10-CM

## 2017-07-14 DIAGNOSIS — M43.06 LUMBAR SPONDYLOLYSIS: ICD-10-CM

## 2017-07-14 LAB
POC AMPHETAMINES: NEGATIVE
POC BARBITURATES: NEGATIVE
POC BENZODIAZEPHINES: NEGATIVE
POC COCAINE: NEGATIVE
POC METHADONE: NEGATIVE
POC METHAMPHETAMINE SCREEN URINE: NEGATIVE
POC OPIATES: POSITIVE
POC OXYCODONE: POSITIVE
POC PHENCYCLIDINE: NEGATIVE
POC PROPOXYPHENE: NEGATIVE
POC THC: NEGATIVE
POC TRICYCLIC ANTIDEPRESSANTS: POSITIVE

## 2017-07-14 PROCEDURE — 80305 DRUG TEST PRSMV DIR OPT OBS: CPT | Performed by: PAIN MEDICINE

## 2017-07-14 PROCEDURE — 99213 OFFICE O/P EST LOW 20 MIN: CPT | Performed by: PAIN MEDICINE

## 2017-07-14 RX ORDER — HYDROCODONE BITARTRATE AND ACETAMINOPHEN 7.5; 325 MG/1; MG/1
1 TABLET ORAL 3 TIMES DAILY PRN
Qty: 90 TABLET | Refills: 0 | Status: SHIPPED | OUTPATIENT
Start: 2017-07-14 | End: 2017-09-14 | Stop reason: SDUPTHER

## 2017-07-14 NOTE — PROGRESS NOTES
CHIEF COMPLAINT: Back Pain    HPI  Jono Garza is a 41 y.o. male.  He is here to follow up for Back Pain    Since last visit their pain has remain unchanged. Pt has been sick with multi-focal PNA over the last month so he was in the hospital for a few days and on abx. States he had a CT and they found a growth/nodule on lungs and swollen lymph nodes so he has to see a pulmonologist soon. States he wants to quit smoking but has not tried something that will work for him to help him quit. He has weaned down the amount of cigarettes he has smoked. He has not seen spine specialist because he was dealing with and still is dealing with the PNA. He said the back brace has been helping his leg a lot, keeping it from going numb and burning.     The patient states their pain is a 4 on a scale of 1-10.  The patient describes this pain as constant dull, ache, sharp, shooting, stabbing and throbbing. The pain is located in Low Back and radiates down posterior aspect of bilateral LE R>L. This painful problem is aggravated by physical activity, twisting, bending and lifting and is alleviated by relaxation, pain medication and heating pad. Is trying to walk more since last visit. BLE associated with numbness, tingling and weakness.      Since last visit he has received his back brace which has helped. Has not scheduled apt with ortho surgeon, plans to schedule before next visit.     Past pain medications: Hydrocodone 5/325 and 7.5/325 QID - not very effective  Hydrocodone 10/325 mg qid - effective - decrease pain level down to a 2-3. Able to be more active, perform more daily activities.   Mobic 15 mg daily - helping      Current pain medications: hydro/apap 7.5-325 mg TID  diclofenac-misoprostol 50-0.2 mg bid prn - only taken for 4-5 days- minimal help but doesn't hurt stomach when taking.   Gabapentin 600 mg TID - helping  Elavil 50 mg qhs - helping him sleep      Past therapies:  Physical Therapy: yes - last visit was  over 1 year ago.   Chiropractor: yes  Massage Therapy: no  TENS: yes - used at PT and chiropractor  Neck or back surgery: no     Previous Injections: yes, in his lower back at [facility]- LESI x 3 - minimal benefit.   Effect of Injection (%): 0%, he said if it had any affect it did not last long.    PEG Assessment   What number best describes your pain on average in the past week? 4-5  What number best describes how, during the past week, pain has interfered with your enjoyment of life? 4  What number best describes how, during the past week, pain has interfered with your general activity? 4      Current Outpatient Prescriptions:   •  alendronate (FOSAMAX) 5 MG tablet, Take 5 mg by mouth Every Morning Before Breakfast., Disp: , Rfl:   •  amitriptyline (ELAVIL) 50 MG tablet, Take 50 mg by mouth Every Night., Disp: , Rfl:   •  diclofenac-misoprostol (ARTHROTEC 50) 50-0.2 MG EC tablet, TAKE ONE TABLET BY MOUTH TWICE A DAY, Disp: 60 tablet, Rfl: 2  •  Ergocalciferol (VITAMIN D2 PO), Take 125 mg by mouth., Disp: , Rfl:   •  Fluticasone Furoate-Vilanterol (BREO ELLIPTA) 100-25 MCG/INH aerosol powder , Inhale Daily., Disp: , Rfl:   •  gabapentin (NEURONTIN) 600 MG tablet, Take 600 mg by mouth 3 (Three) Times a Day., Disp: , Rfl:   •  HYDROcodone-acetaminophen (NORCO) 7.5-325 MG per tablet, Take 1 tablet by mouth 3 (Three) Times a Day As Needed (pain)., Disp: 90 tablet, Rfl: 0  •  Loratadine 10 MG capsule, Take  by mouth., Disp: , Rfl:   •  pramipexole (MIRAPEX) 0.125 MG tablet, Take 0.125 mg by mouth 3 (Three) Times a Day., Disp: , Rfl:   •  theophylline (STEPHANIA-24) 300 MG 24 hr capsule, Take 150 mg by mouth 2 (Two) Times a Day., Disp: , Rfl:     IMAGING  MRI femur 7/27/16  IMPRESSION: Normal MRI of the thigh and femur. Incidental benign lesion noted in the distal femoral shaft as detailed above.       MRI lumbar spine 3/15/16 - in [place] - no results posted - patient states he had it performed, will try to  obtain.  Summary from the [facility] note the lumbar MRI showed:  1. Old superior L1 vertebral body compression fracture no associated retropulsion of vertebral body  2. Mild T12-L1 disc degeneration.  3. Mild L5-S1 disc degeneration producing mild right-sided stenosis      Mri 12/2014:  COMMENT: MRI of the lumbar spine performed without contrast using  routine 1.5T imaging technique. The outside CT scan from 03/28/2014 has  been acquired for comparison.  Sagittal alignment is normal. Chronic mild anterior wedging is seen at  L1 and to a lesser extent at T12 with large Schmorl's nodes at the  superior endplates. The conus medullaris terminates at L1-2 and is  within normal limits. There is mild intervertebral disk desiccation at  T11-12, T12-L1 and L5-S1 with mild associated loss of intervertebral  disk height. There is a small posterior annular fissure at the 5-1  level. There is some marrow signal abnormality seen at the right side  of the sacrum S1 level. This corresponds to the site of the fracture on  the study from 03/28/2014 and is probably related to fracture healing.  Please refer to separate MRI pelvis report. CT scanning is more  sensitive than MRI for discrete fracture line. Otherwise, marrow signal  intensity is unremarkable.  At T12-L1, there is mild concentric disk bulge with mild effacement of  the anterior thecal sac. There is no foraminal impingement.  At L1-2, there is no significant abnormality. No canal or foraminal  impingement.  At L2-3, no significant abnormality.  At L3-4, mild facet hypertrophy. No canal or foraminal compromise.  At L4-5, mild facet degenerative change bilaterally, left more so than  right. No canal stenosis or foraminal impingement.  At L5-S1, mild facet degenerative change bilaterally. Mild broad-based  posterior disk bulge with a superimposed extrusion in a right paramedian  location extending slightly caudad from the level of the disk resulting  contiguous  with it resulting in mild mass effect on the right lateral  recess, expected location of the right S1 root. There is however no  significant central canal stenosis. There is mild right-sided foraminal  narrowing and only minimal left foraminal narrowing.  IMPRESSION-  1. There is some marrow edema in the right sacral ala at site of  previously diagnosed fracture. This is probably related to fracture  healing, but please correlate with the pelvic MRI and please be aware  that CT scanning is more sensitive than MRI for assessment of the  fracture line itself.  2. At the L5-S1 level, there is a rightward disk extrusion with some  mild mass effect on the right lateral recess expected location of the  right S1 root, but there is no central canal stenosis. Please refer to  the multiple level description and level-by-level discussion above.  3. Chronic anterior wedging at T12 and L1. No acute compression  Fracture.      Lumbar x-rays with flexion extension views 3/31/2016:  1. Stable chronic mild superior L1 vertebral body compression deformity, no acute fracture   2. New amount posterior L5 disc narrowing likely due to early degenerative change  3. Diffuse osteopenia greater than expected for age  4. Decreased range of motion      EMG on 3/31/2016:  1. Normal study    PFSH:  The following portions of the patient's history were reviewed and updated as appropriate: problem list, past medical history, past surgery history, social history, family history, medications, and allergies    Review of Systems   Constitutional: Negative for chills and fever.   Respiratory: Positive for cough and shortness of breath. Apnea: needs to get tested for sleep apnea he states.    Cardiovascular: Negative for chest pain.   Gastrointestinal: Negative for abdominal pain, constipation, diarrhea, nausea and vomiting.   Genitourinary: Negative for difficulty urinating and dysuria.   Musculoskeletal: Positive for back pain.   Neurological: Positive  "for numbness. Negative for dizziness, weakness, light-headedness and headaches.   Psychiatric/Behavioral: Positive for sleep disturbance. Negative for agitation, confusion, hallucinations and suicidal ideas. The patient is nervous/anxious.        Vitals:    07/14/17 1010   BP: 136/85   Pulse: 107   Resp: 16   Temp: 97.7 °F (36.5 °C)   SpO2: 97%   Weight: 226 lb 12.8 oz (103 kg)   Height: 69\" (175.3 cm)   PainSc:   4   PainLoc: Back       Physical Exam   Constitutional: He is oriented to person, place, and time. He appears well-developed and well-nourished. No distress.   HENT:   Head: Normocephalic and atraumatic.   Nose: Nose normal.   Mouth/Throat: Oropharynx is clear and moist.   Eyes: Conjunctivae and EOM are normal.   Neck: Normal range of motion. Neck supple.   Pulmonary/Chest: Effort normal. No stridor. No respiratory distress.   Musculoskeletal:        Right hip: He exhibits tenderness.        Left hip: He exhibits tenderness.        Lumbar back: He exhibits tenderness and pain.   +bilateral lumbar facet tenderness  +bilateral lumbar facet loading   +TTP over sacral and pelvic bones bilaterally      Neurological: He is alert and oriented to person, place, and time. No cranial nerve deficit or sensory deficit.   Skin: Skin is warm and dry. No rash noted. He is not diaphoretic.   Psychiatric: He has a normal mood and affect. His speech is normal and behavior is normal.   Nursing note and vitals reviewed.    Ortho Exam  Neurologic Exam     Mental Status   Oriented to person, place, and time.   Speech: speech is normal     Cranial Nerves     CN III, IV, VI   Extraocular motions are normal.     Motor Exam        Some BLE weakness- equal        Lab Results   Component Value Date    POCMETH Negative 11/29/2016    POCAMPHET Negative 11/29/2016    POCBARBITUR Negative 11/29/2016    POCBENZO Negative 11/29/2016    POCCOCAINE Negative 11/29/2016    POCMETHADO Negative 11/29/2016    POCOPIATES Negative 11/29/2016    " POCOXYCODO Negative 11/29/2016    POCPHENCYC Negative 11/29/2016    POCPROPOXY Negative 11/29/2016    POCTHC Negative 11/29/2016    POCTRICYC Positive (A) 11/29/2016     Last UDS results reviewed: 07/14/17   Last UDS: 3/23/2017  Comments: Consistent       Date of last LUISA reviewed : 07/14/17   Comments: Consistent     Assessment/Plan   Jono was seen today for back pain.    Diagnoses and all orders for this visit:    Chronic bilateral low back pain with left-sided sciatica    Lumbar spondylolysis    DDD (degenerative disc disease), lumbar    - Continue wearing low back brace as it is helping.   - Last UDS performed was reviewed and consistent.   - UDS done today, will check on next visit.  - Denial of TFESI in past, will submit for LESI given his pain is BLE and is associated with radiculopathy and is bilateral. BLE weakness is associated.   - Dismissed from previous pain clinic, will monitor closely. Given the patient had been dismissed from a previous pain clinic, we'll continue to closely monitor the patient for any abuse or misuse.  - CONTINUE norco 7.5/325 mg tid prn pain. (#90, 1 refills) Will not increase past this dose as he is young and do not want to build up a tolerance.   - Two months prescriptions given today. Appropriate DNF dates applied.   - Continue diclofenac-misoprostol 50-0.2 mg. No refills needed today.   - Continue gabapentin 600 mg tid - helping, No refills needed today.   - Continue elavil 50 mg qhs - helping, no refills needed today.   - DVD given to the patient about SCS. We'll address in the future if back pain worsens. He wants to manage with medication and injections at this time.  - Referral to orthospine placed 11/29/2016 for further evaluation given continued severe low back and pelvic pain out of proportion to exam. The patient has never been evaluated by a surgeon in the past. His only diagnosis is continued pain after multiple pelvic fractures. His pain should've continued  to heal over the years but given his extreme pain should be evaluated to make sure there is not anything concerning from a surgical aspects. He is to call and make apt before next visit.   - Continue home exercise program.     Wt Readings from Last 3 Encounters:   07/14/17 226 lb 12.8 oz (103 kg)   03/17/17 241 lb 3.2 oz (109 kg)   01/17/17 236 lb 6.4 oz (107 kg)     Body mass index is 33.49 kg/(m^2). Patient counseled on the importance of weight loss to help with overall health and pain control. Patient instructed to attempt weight loss.   Plan: Calorie counting  increase physical activity, reduce portion size, cut out extra servings and reduce fast food intake    Follow-up in 2 month.    LUISA REPORT  As part of the patient's treatment plan, I am prescribing controlled substances. The patient has been made aware of appropriate use of such medications, including potential risk of somnolence, limited ability to drive and/or work safely, and the potential for dependence or overdose. It has also bee made clear that these medications are for use by this patient only, without concomitant use of alcohol or other substances unless prescribed.     Patient has completed prescribing agreement detailing terms of continued prescribing of controlled substances, including monitoring LUISA reports, urine drug screening, and pill counts if necessary. The patient is aware that inappropriate use will results in cessation of prescribing such medications.    LUISA report has been reviewed and scanned into the patient's chart.    History and physical exam exhibit continued safe and appropriate use of controlled substances.     Chante Duarte MD  Pain Management

## 2017-08-23 ENCOUNTER — OUTSIDE FACILITY SERVICE (OUTPATIENT)
Dept: PAIN MEDICINE | Facility: CLINIC | Age: 42
End: 2017-08-23

## 2017-08-23 ENCOUNTER — DOCUMENTATION (OUTPATIENT)
Dept: PAIN MEDICINE | Facility: CLINIC | Age: 42
End: 2017-08-23

## 2017-08-23 PROCEDURE — 62323 NJX INTERLAMINAR LMBR/SAC: CPT | Performed by: PAIN MEDICINE

## 2017-08-23 NOTE — PROGRESS NOTES
Lumbar Epidural Steroid Injection  [facility]      PREOPERATIVE DIAGNOSIS:   Lumbar Radiculopathy  POSTOPERATIVE DIAGNOSIS:  Same as preop diagnosis    PROCEDURE:   Lumbar Epidural Steroid Injection, Therapeutic Translaminar Injection, with epidurogram, at  L5/S1 level    PRE-PROCEDURE DISCUSSION WITH PATIENT:    Risks and complications were discussed with the patient prior to starting the procedure and informed consent was obtained.  We discussed various topics including but not limited to bleeding, infection, injury, paralysis, nerve injury, dural puncture, coma, death, worsening of clinical picture, lack of pain relief, and postprocedural soreness.    SURGEON:  Chante Duarte MD    REASON FOR PROCEDURE:    Low back pain is worsening. The patient describes this pain as constant dull, ache, sharp, shooting, stabbing and throbbing. The pain is located in Low Back and radiates down posterior aspect of bilateral LE R>L. This painful problem is aggravated by physical activity, twisting, bending and lifting and is alleviated by relaxation, pain medication and heating pad. Is trying to walk more since last visit. BLE associated with numbness, tingling and weakness. This is his first LESI to see if he can receive pain relief.       SEDATION:  Versed 2mg & Fentanyl 100 mcg IV  ANESTHETIC:  Marcaine 0.25%  STEROID:   Methylprednisolone (DEPO MEDROL) 80mg/ml    DESCRIPTON OF PROCEDURE:    After obtaining informed consent, I.V. was started in the preop area.   The patient was taken to the operating room and placed in the prone position.  EKG, blood pressure, and pulse oximeter were monitored throughout, and sedation was provided as needed by the RN under my guidance. All pressure points were well padded.  The lumbar spine area was prepped with Chloraprep and draped in a sterile fashion.  Under fluoroscopic guidance, the above mentioned interlaminar space was identified. Skin and subcutaneous tissues  were anesthetized with 1% lidocaine in the middle of the space. A 20-gauge Tuohy needle was introduced through the skin and advanced to this interlaminar space and into the epidural space under fluoroscopic guidance and verified with loss-of-resistance technique to air.  After confirming the position of the needle with the fluoroscope with all the views, and after aspiration was confirmed negative for blood and CSF, 1.5 mL of Omnipaque was injected.  After seeing appropriate epidurogram with lateral and PA views, a total of 3 cc solution was injected, consisting of 1cc of local anesthetic as above, with normal saline and injectable steroid as above.       ESTIMATED BLOOD LOSS:  <5 mL  SPECIMENS:  None    COMPLICATIONS:     No complications were noted., There was no indication of vascular uptake on live injection of contrast dye. and There was no indication of intrathecal uptake on live injection of contrast dye.    TOLERANCE & DISCHARGE CONDITION:    The patient tolerated the procedure well.  The patient was transported to the recovery area without difficulties.  The patient was discharged to home under the care of family in stable and satisfactory condition.    PLAN OF CARE:  1. The patient was given our standard instruction sheet.  2. The patient will Return to clinic 3-4 wks  3. The patient will resume all medications as per the medication reconciliation sheet.

## 2017-08-29 ENCOUNTER — TRANSCRIBE ORDERS (OUTPATIENT)
Dept: ADMINISTRATIVE | Facility: HOSPITAL | Age: 42
End: 2017-08-29

## 2017-08-29 DIAGNOSIS — K31.89 STOMACH IRRITATION: ICD-10-CM

## 2017-08-29 DIAGNOSIS — G89.29 CHRONIC BILATERAL LOW BACK PAIN WITH LEFT-SIDED SCIATICA: ICD-10-CM

## 2017-08-29 DIAGNOSIS — M54.42 CHRONIC BILATERAL LOW BACK PAIN WITH LEFT-SIDED SCIATICA: ICD-10-CM

## 2017-08-29 DIAGNOSIS — M51.36 DDD (DEGENERATIVE DISC DISEASE), LUMBAR: ICD-10-CM

## 2017-08-29 DIAGNOSIS — R91.8 MULTIPLE LUNG NODULES: Primary | ICD-10-CM

## 2017-08-29 RX ORDER — DICLOFENAC SODIUM AND MISOPROSTOL 50; 200 MG/1; UG/1
TABLET, DELAYED RELEASE ORAL
Qty: 60 TABLET | Refills: 5 | Status: SHIPPED | OUTPATIENT
Start: 2017-08-29 | End: 2018-03-01 | Stop reason: SDUPTHER

## 2017-09-12 ENCOUNTER — HOSPITAL ENCOUNTER (OUTPATIENT)
Dept: CT IMAGING | Facility: HOSPITAL | Age: 42
Discharge: HOME | End: 2017-09-12
Attending: INTERNAL MEDICINE | Admitting: INTERNAL MEDICINE

## 2017-09-12 ENCOUNTER — APPOINTMENT (OUTPATIENT)
Dept: CT IMAGING | Facility: HOSPITAL | Age: 42
End: 2017-09-12
Attending: INTERNAL MEDICINE

## 2017-09-12 DIAGNOSIS — R91.8 MULTIPLE LUNG NODULES: ICD-10-CM

## 2017-09-12 PROCEDURE — 71250 CT THORAX DX C-: CPT

## 2017-09-14 ENCOUNTER — OFFICE VISIT (OUTPATIENT)
Dept: PAIN MEDICINE | Facility: CLINIC | Age: 42
End: 2017-09-14

## 2017-09-14 VITALS
OXYGEN SATURATION: 96 % | SYSTOLIC BLOOD PRESSURE: 126 MMHG | WEIGHT: 225.8 LBS | BODY MASS INDEX: 33.44 KG/M2 | HEART RATE: 96 BPM | RESPIRATION RATE: 18 BRPM | HEIGHT: 69 IN | DIASTOLIC BLOOD PRESSURE: 77 MMHG | TEMPERATURE: 97.6 F

## 2017-09-14 DIAGNOSIS — M51.36 DDD (DEGENERATIVE DISC DISEASE), LUMBAR: ICD-10-CM

## 2017-09-14 DIAGNOSIS — M54.42 CHRONIC BILATERAL LOW BACK PAIN WITH BILATERAL SCIATICA: ICD-10-CM

## 2017-09-14 DIAGNOSIS — M54.41 CHRONIC BILATERAL LOW BACK PAIN WITH BILATERAL SCIATICA: ICD-10-CM

## 2017-09-14 DIAGNOSIS — G89.29 CHRONIC BILATERAL LOW BACK PAIN WITH BILATERAL SCIATICA: ICD-10-CM

## 2017-09-14 PROCEDURE — 99213 OFFICE O/P EST LOW 20 MIN: CPT | Performed by: PAIN MEDICINE

## 2017-09-14 RX ORDER — HYDROCODONE BITARTRATE AND ACETAMINOPHEN 7.5; 325 MG/1; MG/1
1 TABLET ORAL 3 TIMES DAILY PRN
Qty: 90 TABLET | Refills: 0 | Status: SHIPPED | OUTPATIENT
Start: 2017-09-14 | End: 2017-11-13 | Stop reason: SDUPTHER

## 2017-09-14 RX ORDER — HYDROCODONE BITARTRATE AND ACETAMINOPHEN 7.5; 325 MG/1; MG/1
1 TABLET ORAL 3 TIMES DAILY PRN
Qty: 90 TABLET | Refills: 0 | Status: SHIPPED | OUTPATIENT
Start: 2017-09-14 | End: 2017-09-25 | Stop reason: SDUPTHER

## 2017-09-14 NOTE — PROGRESS NOTES
CHIEF COMPLAINT: Back Pain    HPI  Jono Garza is a 42 y.o. male.  He is here to follow up for Back Pain  .  Jono Garza is a 42 y.o. male  who presents to the office for follow-up.  He completed a Lumbar Epidural Steroid Injection at  L5/S1 on 8/23/17 performed for management of back pain. Patient reports 15% relief from the procedure. Pain gradually returned after 5 days. Since last visit their pain has remain unchanged.     The patient states their pain is a 4.5 on a scale of 1-10.  The patient describes this pain as constant dull, ache, sharp, shooting, stabbing and throbbing. The pain is located in Low Back and radiates down posterior aspect of bilateral LE L>R- use to have more radiation down LRE which has improved since injection. This painful problem is aggravated by physical activity, twisting, bending and lifting and is alleviated by injection, relaxation, pain medication and heating pad. BLE associated with numbness, tingling and weakness.     Is trying to walk more since last visit. Tries to walk every day but difficult with weather.   Since last visit he has received his back brace which has helped. Has not scheduled apt with ortho surgeon, plans to schedule before next visit.     Past pain medications: Hydrocodone 5/325 and 7.5/325 QID - not very effective  Hydrocodone 10/325 mg qid - effective - decrease pain level down to a 2-3. Able to be more active, perform more daily activities.   Mobic 15 mg daily - helping      Current pain medications: hydro/apap 7.5-325 mg TID  diclofenac-misoprostol 50-0.2 mg bid prn - only taken for 4-5 days- minimal help but doesn't hurt stomach when taking.   Gabapentin 600 mg TID - helping  Elavil 50 mg qhs - helping him sleep      Past therapies:  Physical Therapy: yes - last visit was over 1 year ago.   Chiropractor: yes  Massage Therapy: no  TENS: yes - used at PT and chiropractor  Neck or back surgery: no      Previous Injection: Lumbar Epidural  Steroid Injection at  L5/S1 on 8/23/17  Effect of Injection (%): 15%  Length of Relief: 3-5 days     Previous Injections: yes, in his lower back at [facility]- LESI x 3 - minimal benefit.   Effect of Injection (%): 0%, he said if it had any affect it did not last long    PEG Assessment   What number best describes your pain on average in the past week? 5  What number best describes how, during the past week, pain has interfered with your enjoyment of life? 5  What number best describes how, during the past week, pain has interfered with your general activity? 5      Current Outpatient Prescriptions:   •  alendronate (FOSAMAX) 5 MG tablet, Take 5 mg by mouth Every Morning Before Breakfast., Disp: , Rfl:   •  amitriptyline (ELAVIL) 50 MG tablet, Take 50 mg by mouth Every Night., Disp: , Rfl:   •  diclofenac-misoprostol (ARTHROTEC 50) 50-0.2 MG EC tablet, TAKE ONE TABLET BY MOUTH TWICE A DAY, Disp: 60 tablet, Rfl: 5  •  Ergocalciferol (VITAMIN D2 PO), Take 125 mg by mouth., Disp: , Rfl:   •  Fluticasone Furoate-Vilanterol (BREO ELLIPTA) 100-25 MCG/INH aerosol powder , Inhale Daily., Disp: , Rfl:   •  gabapentin (NEURONTIN) 600 MG tablet, Take 600 mg by mouth 3 (Three) Times a Day., Disp: , Rfl:   •  HYDROcodone-acetaminophen (NORCO) 7.5-325 MG per tablet, Take 1 tablet by mouth 3 (Three) Times a Day As Needed (pain)., Disp: 90 tablet, Rfl: 0  •  HYDROcodone-acetaminophen (NORCO) 7.5-325 MG per tablet, Take 1 tablet by mouth 3 (Three) Times a Day As Needed (pain)., Disp: 90 tablet, Rfl: 0  •  Loratadine 10 MG capsule, Take  by mouth., Disp: , Rfl:   •  pramipexole (MIRAPEX) 0.125 MG tablet, Take 0.125 mg by mouth 3 (Three) Times a Day., Disp: , Rfl:   •  theophylline (STEPHANIA-24) 300 MG 24 hr capsule, Take 150 mg by mouth 2 (Two) Times a Day., Disp: , Rfl:     IMAGING  MRI femur 7/27/16  IMPRESSION: Normal MRI of the thigh and femur. Incidental benign lesion noted in the distal femoral shaft as detailed above.        MRI lumbar spine 3/15/16 - in [facility] - no results posted - patient states he had it performed, will try to obtain.  Summary from the [facility] note the lumbar MRI showed:  1. Old superior L1 vertebral body compression fracture no associated retropulsion of vertebral body  2. Mild T12-L1 disc degeneration.  3. Mild L5-S1 disc degeneration producing mild right-sided stenosis      Mri 12/2014:  COMMENT: MRI of the lumbar spine performed without contrast using  routine 1.5T imaging technique. The outside CT scan from 03/28/2014 has  been acquired for comparison.  Sagittal alignment is normal. Chronic mild anterior wedging is seen at  L1 and to a lesser extent at T12 with large Schmorl's nodes at the  superior endplates. The conus medullaris terminates at L1-2 and is  within normal limits. There is mild intervertebral disk desiccation at  T11-12, T12-L1 and L5-S1 with mild associated loss of intervertebral  disk height. There is a small posterior annular fissure at the 5-1  level. There is some marrow signal abnormality seen at the right side  of the sacrum S1 level. This corresponds to the site of the fracture on  the study from 03/28/2014 and is probably related to fracture healing.  Please refer to separate MRI pelvis report. CT scanning is more  sensitive than MRI for discrete fracture line. Otherwise, marrow signal  intensity is unremarkable.  At T12-L1, there is mild concentric disk bulge with mild effacement of  the anterior thecal sac. There is no foraminal impingement.  At L1-2, there is no significant abnormality. No canal or foraminal  impingement.  At L2-3, no significant abnormality.  At L3-4, mild facet hypertrophy. No canal or foraminal compromise.  At L4-5, mild facet degenerative change bilaterally, left more so than  right. No canal stenosis or foraminal impingement.  At L5-S1, mild facet degenerative change bilaterally. Mild broad-based  posterior disk bulge with a superimposed  extrusion in a right paramedian  location extending slightly caudad from the level of the disk resulting  contiguous with it resulting in mild mass effect on the right lateral  recess, expected location of the right S1 root. There is however no  significant central canal stenosis. There is mild right-sided foraminal  narrowing and only minimal left foraminal narrowing.  IMPRESSION-  1. There is some marrow edema in the right sacral ala at site of  previously diagnosed fracture. This is probably related to fracture  healing, but please correlate with the pelvic MRI and please be aware  that CT scanning is more sensitive than MRI for assessment of the  fracture line itself.  2. At the L5-S1 level, there is a rightward disk extrusion with some  mild mass effect on the right lateral recess expected location of the  right S1 root, but there is no central canal stenosis. Please refer to  the multiple level description and level-by-level discussion above.  3. Chronic anterior wedging at T12 and L1. No acute compression  Fracture.      Lumbar x-rays with flexion extension views 3/31/2016:  1. Stable chronic mild superior L1 vertebral body compression deformity, no acute fracture   2. New amount posterior L5 disc narrowing likely due to early degenerative change  3. Diffuse osteopenia greater than expected for age  4. Decreased range of motion      EMG on 3/31/2016:  1. Normal study    PFSH:  The following portions of the patient's history were reviewed and updated as appropriate: problem list, past medical history, past surgery history, social history, family history, medications, and allergies    Review of Systems   Constitutional: Negative for fatigue.   HENT: Positive for congestion.    Eyes: Negative for visual disturbance.   Respiratory: Positive for cough, shortness of breath and wheezing.    Cardiovascular: Positive for leg swelling (left thigh). Negative for chest pain and palpitations.   Gastrointestinal: Negative  "for constipation and diarrhea.   Genitourinary: Negative for difficulty urinating.   Musculoskeletal: Positive for back pain.   Neurological: Positive for weakness and numbness.   Psychiatric/Behavioral: Positive for sleep disturbance. Negative for suicidal ideas. The patient is nervous/anxious.        Vitals:    09/14/17 1011   BP: 126/77   Pulse: 96   Resp: 18   Temp: 97.6 °F (36.4 °C)   SpO2: 96%   Weight: 225 lb 12.8 oz (102 kg)   Height: 69\" (175.3 cm)       Physical Exam   Constitutional: He is oriented to person, place, and time. He appears well-developed and well-nourished. No distress.   HENT:   Head: Normocephalic and atraumatic.   Nose: Nose normal.   Mouth/Throat: Oropharynx is clear and moist.   Eyes: Conjunctivae and EOM are normal.   Neck: Normal range of motion. Neck supple.   Pulmonary/Chest: Effort normal. No stridor. No respiratory distress.   Musculoskeletal:        Right hip: He exhibits tenderness.        Left hip: He exhibits tenderness.        Lumbar back: He exhibits tenderness and pain.   +bilateral lumbar facet tenderness  +bilateral lumbar facet loading   +TTP over sacral and pelvic bones bilaterally      Neurological: He is alert and oriented to person, place, and time. No cranial nerve deficit or sensory deficit.   Skin: Skin is warm and dry. No rash noted. He is not diaphoretic.   Psychiatric: He has a normal mood and affect. His speech is normal and behavior is normal.   Nursing note and vitals reviewed.    Ortho Exam  Neurologic Exam     Mental Status   Oriented to person, place, and time.   Speech: speech is normal     Cranial Nerves     CN III, IV, VI   Extraocular motions are normal.       Lab Results   Component Value Date    POCMETH Negative 07/14/2017    POCAMPHET Negative 07/14/2017    POCBARBITUR Negative 07/14/2017    POCBENZO Negative 07/14/2017    POCCOCAINE Negative 07/14/2017    POCMETHADO Negative 07/14/2017    POCOPIATES Positive (A) 07/14/2017    POCOXYCODO Positive " (A) 07/14/2017    POCPHENCYC Negative 07/14/2017    POCPROPOXY Negative 07/14/2017    POCTHC Negative 07/14/2017    POCTRICYC Positive (A) 07/14/2017     Last UDS results reviewed: 09/19/17   Last UDS: 7/18/17  Comments: Consistent       Date of last LUISA reviewed : 09/19/17   Comments: Consistent     Assessment/Plan   Jono was seen today for back pain.    Diagnoses and all orders for this visit:    DDD (degenerative disc disease), lumbar  -     HYDROcodone-acetaminophen (NORCO) 7.5-325 MG per tablet; Take 1 tablet by mouth 3 (Three) Times a Day As Needed (pain).  -     HYDROcodone-acetaminophen (NORCO) 7.5-325 MG per tablet; Take 1 tablet by mouth 3 (Three) Times a Day As Needed (pain).    Chronic bilateral low back pain with bilateral sciatica  -     HYDROcodone-acetaminophen (NORCO) 7.5-325 MG per tablet; Take 1 tablet by mouth 3 (Three) Times a Day As Needed (pain).  -     HYDROcodone-acetaminophen (NORCO) 7.5-325 MG per tablet; Take 1 tablet by mouth 3 (Three) Times a Day As Needed (pain).      Requested Prescriptions     Signed Prescriptions Disp Refills   • HYDROcodone-acetaminophen (NORCO) 7.5-325 MG per tablet 90 tablet 0     Sig: Take 1 tablet by mouth 3 (Three) Times a Day As Needed (pain).   • HYDROcodone-acetaminophen (NORCO) 7.5-325 MG per tablet 90 tablet 0     Sig: Take 1 tablet by mouth 3 (Three) Times a Day As Needed (pain).     - plans to have scope performed this month.   - Continue wearing low back brace as it is helping.   - Last UDS performed was reviewed and consistent.   - Random urine drug screen per office policy AT NEXT VISIT.   - Minimal benefit with ALISA, can repeat in future to see if he gets any more benefit. Will hold off for now.   - Given the patient had been dismissed from a previous pain clinic, we'll continue to closely monitor the patient for any abuse or misuse.  - CONTINUE norco 7.5/325 mg tid prn pain. (#90, 1 refills) Asking for increase in medication today. Will  not increase past this dose as he is young and do not want to build up a tolerance. Discussed this with patient on why he should remain on a low dose.   - Two months prescriptions given today. Appropriate DNF dates applied.   - Continue diclofenac-misoprostol 50-0.2 mg. No refills needed today.   - Continue gabapentin 600 mg tid - helping, No refills needed today.   - Continue elavil 50 mg qhs - helping, no refills needed today.   - Referral to orthospine placed 11/29/2016 for further evaluation given continued severe low back and pelvic pain out of proportion to exam. The patient has never been evaluated by a surgeon in the past. His only diagnosis is continued pain after multiple pelvic fractures. His pain should've continued to heal over the years but given his extreme pain should be evaluated to make sure there is not anything concerning from a surgical aspects. He is to call and make apt before next visit.   - Continue home exercise program.   -  Given his severe pain that is not responsive to high dose narcotics, patient would likely benefit from neuromodulation. He does not want to trial at this time.       Wt Readings from Last 3 Encounters:   09/14/17 225 lb 12.8 oz (102 kg)   07/14/17 226 lb 12.8 oz (103 kg)   03/17/17 241 lb 3.2 oz (109 kg)     Body mass index is 33.34 kg/(m^2). weight loss of 15 lbs over the past 6 month(s)    Intentional?  Yes Patient counseled on the importance of weight loss to help with overall health and pain control. Patient instructed to attempt weight loss.   Plan:   increase physical activity  Now walks 3 times/week. Continue to increase walking/steps.     Follow-up in 2 months.    Chante Duarte MD  Pain Management      [facility] REPORT  As part of the patient's treatment plan, I am prescribing controlled substances. The patient has been made aware of appropriate use of such medications, including potential risk of somnolence, limited ability to drive and/or work safely,  and the potential for dependence or overdose. It has also bee made clear that these medications are for use by this patient only, without concomitant use of alcohol or other substances unless prescribed.   Patient has completed prescribing agreement detailing terms of continued prescribing of controlled substances, including monitoring LUISA reports, urine drug screening, and pill counts if necessary. The patient is aware that inappropriate use will results in cessation of prescribing such medications.  LUISA report has been reviewed and scanned into the patient's chart.  History and physical exam exhibit continued safe and appropriate use of controlled substances.

## 2017-09-26 ENCOUNTER — ANESTHESIA (OUTPATIENT)
Dept: GASTROENTEROLOGY | Facility: HOSPITAL | Age: 42
End: 2017-09-26

## 2017-09-26 ENCOUNTER — HOSPITAL ENCOUNTER (OUTPATIENT)
Facility: HOSPITAL | Age: 42
Setting detail: HOSPITAL OUTPATIENT SURGERY
Discharge: HOME | End: 2017-09-26
Attending: INTERNAL MEDICINE | Admitting: INTERNAL MEDICINE

## 2017-09-26 ENCOUNTER — ANESTHESIA EVENT (OUTPATIENT)
Dept: GASTROENTEROLOGY | Facility: HOSPITAL | Age: 42
End: 2017-09-26

## 2017-09-26 VITALS
DIASTOLIC BLOOD PRESSURE: 59 MMHG | HEART RATE: 89 BPM | HEIGHT: 69 IN | OXYGEN SATURATION: 95 % | SYSTOLIC BLOOD PRESSURE: 106 MMHG | TEMPERATURE: 97.6 F | BODY MASS INDEX: 33.3 KG/M2 | RESPIRATION RATE: 18 BRPM | WEIGHT: 224.8 LBS

## 2017-09-26 DIAGNOSIS — J18.9 PNA (PNEUMONIA): ICD-10-CM

## 2017-09-26 LAB
APPEARANCE FLD: ABNORMAL
APTT PPP: 32.3 SECONDS (ref 22.7–35.4)
BASOPHILS # BLD AUTO: 0.04 10*3/MM3 (ref 0–0.2)
BASOPHILS NFR BLD AUTO: 0.4 % (ref 0–1.5)
COLOR FLD: COLORLESS
DEPRECATED RDW RBC AUTO: 45.9 FL (ref 37–54)
EOSINOPHIL # BLD AUTO: 0.38 10*3/MM3 (ref 0–0.7)
EOSINOPHIL NFR BLD AUTO: 3.7 % (ref 0.3–6.2)
EOSINOPHIL NFR FLD MANUAL: 4 %
ERYTHROCYTE [DISTWIDTH] IN BLOOD BY AUTOMATED COUNT: 13.1 % (ref 11.5–14.5)
HCT VFR BLD AUTO: 39 % (ref 40.4–52.2)
HGB BLD-MCNC: 13.7 G/DL (ref 13.7–17.6)
IMM GRANULOCYTES # BLD: 0.04 10*3/MM3 (ref 0–0.03)
IMM GRANULOCYTES NFR BLD: 0.4 % (ref 0–0.5)
INR PPP: 0.98 (ref 0.9–1.1)
LYMPHOCYTES # BLD AUTO: 3.3 10*3/MM3 (ref 0.9–4.8)
LYMPHOCYTES NFR BLD AUTO: 32.2 % (ref 19.6–45.3)
LYMPHOCYTES NFR FLD MANUAL: 4 %
MCH RBC QN AUTO: 33.7 PG (ref 27–32.7)
MCHC RBC AUTO-ENTMCNC: 35.1 G/DL (ref 32.6–36.4)
MCV RBC AUTO: 96.1 FL (ref 79.8–96.2)
METHOD: ABNORMAL
MONOCYTES # BLD AUTO: 0.67 10*3/MM3 (ref 0.2–1.2)
MONOCYTES NFR BLD AUTO: 6.5 % (ref 5–12)
MONOCYTES NFR FLD: 4 %
NEUTROPHILS # BLD AUTO: 5.81 10*3/MM3 (ref 1.9–8.1)
NEUTROPHILS NFR BLD AUTO: 56.8 % (ref 42.7–76)
NEUTROPHILS NFR FLD MANUAL: 88 %
NUC CELL # FLD: 1010 /MM3
OTHER CELLS FLUID PER 100/WBCS: 69 /100 WBCS
PLATELET # BLD AUTO: 390 10*3/MM3 (ref 140–500)
PMV BLD AUTO: 10 FL (ref 6–12)
PROTHROMBIN TIME: 12.6 SECONDS (ref 11.7–14.2)
RBC # BLD AUTO: 4.06 10*6/MM3 (ref 4.6–6)
RBC # FLD AUTO: 665 /MM3
WBC NRBC COR # BLD: 10.24 10*3/MM3 (ref 4.5–10.7)

## 2017-09-26 PROCEDURE — 85025 COMPLETE CBC W/AUTO DIFF WBC: CPT | Performed by: INTERNAL MEDICINE

## 2017-09-26 PROCEDURE — 88112 CYTOPATH CELL ENHANCE TECH: CPT | Performed by: INTERNAL MEDICINE

## 2017-09-26 PROCEDURE — 88312 SPECIAL STAINS GROUP 1: CPT | Performed by: INTERNAL MEDICINE

## 2017-09-26 PROCEDURE — 87206 SMEAR FLUORESCENT/ACID STAI: CPT | Performed by: INTERNAL MEDICINE

## 2017-09-26 PROCEDURE — 87071 CULTURE AEROBIC QUANT OTHER: CPT | Performed by: INTERNAL MEDICINE

## 2017-09-26 PROCEDURE — 87102 FUNGUS ISOLATION CULTURE: CPT | Performed by: INTERNAL MEDICINE

## 2017-09-26 PROCEDURE — 89051 BODY FLUID CELL COUNT: CPT | Performed by: INTERNAL MEDICINE

## 2017-09-26 PROCEDURE — 85730 THROMBOPLASTIN TIME PARTIAL: CPT | Performed by: INTERNAL MEDICINE

## 2017-09-26 PROCEDURE — 85610 PROTHROMBIN TIME: CPT | Performed by: INTERNAL MEDICINE

## 2017-09-26 PROCEDURE — 25010000002 PROPOFOL 10 MG/ML EMULSION: Performed by: ANESTHESIOLOGY

## 2017-09-26 PROCEDURE — 87116 MYCOBACTERIA CULTURE: CPT | Performed by: INTERNAL MEDICINE

## 2017-09-26 PROCEDURE — 87205 SMEAR GRAM STAIN: CPT | Performed by: INTERNAL MEDICINE

## 2017-09-26 PROCEDURE — 87070 CULTURE OTHR SPECIMN AEROBIC: CPT | Performed by: INTERNAL MEDICINE

## 2017-09-26 RX ORDER — SODIUM CHLORIDE, SODIUM LACTATE, POTASSIUM CHLORIDE, CALCIUM CHLORIDE 600; 310; 30; 20 MG/100ML; MG/100ML; MG/100ML; MG/100ML
30 INJECTION, SOLUTION INTRAVENOUS CONTINUOUS
Status: DISCONTINUED | OUTPATIENT
Start: 2017-09-26 | End: 2017-09-26 | Stop reason: HOSPADM

## 2017-09-26 RX ORDER — LIDOCAINE HYDROCHLORIDE 20 MG/ML
INJECTION, SOLUTION INFILTRATION; PERINEURAL AS NEEDED
Status: DISCONTINUED | OUTPATIENT
Start: 2017-09-26 | End: 2017-09-26 | Stop reason: SURG

## 2017-09-26 RX ORDER — LISINOPRIL 20 MG/1
20 TABLET ORAL DAILY
COMMUNITY
End: 2017-11-28 | Stop reason: ALTCHOICE

## 2017-09-26 RX ORDER — ALBUTEROL SULFATE 90 UG/1
2 AEROSOL, METERED RESPIRATORY (INHALATION) EVERY 4 HOURS PRN
COMMUNITY

## 2017-09-26 RX ORDER — SODIUM CHLORIDE 0.9 % (FLUSH) 0.9 %
1-10 SYRINGE (ML) INJECTION AS NEEDED
Status: DISCONTINUED | OUTPATIENT
Start: 2017-09-26 | End: 2017-09-26 | Stop reason: HOSPADM

## 2017-09-26 RX ORDER — PROPOFOL 10 MG/ML
VIAL (ML) INTRAVENOUS CONTINUOUS PRN
Status: DISCONTINUED | OUTPATIENT
Start: 2017-09-26 | End: 2017-09-26 | Stop reason: SURG

## 2017-09-26 RX ORDER — MELOXICAM 15 MG/1
15 TABLET ORAL DAILY
COMMUNITY
End: 2018-03-01 | Stop reason: ALTCHOICE

## 2017-09-26 RX ORDER — PROPOFOL 10 MG/ML
VIAL (ML) INTRAVENOUS AS NEEDED
Status: DISCONTINUED | OUTPATIENT
Start: 2017-09-26 | End: 2017-09-26 | Stop reason: SURG

## 2017-09-26 RX ORDER — LIDOCAINE HYDROCHLORIDE 10 MG/ML
INJECTION, SOLUTION EPIDURAL; INFILTRATION; INTRACAUDAL; PERINEURAL AS NEEDED
Status: DISCONTINUED | OUTPATIENT
Start: 2017-09-26 | End: 2017-09-26 | Stop reason: HOSPADM

## 2017-09-26 RX ORDER — FLUCONAZOLE 100 MG/1
50 TABLET ORAL 2 TIMES DAILY
COMMUNITY
End: 2018-04-17

## 2017-09-26 RX ADMIN — PROPOFOL 20 MG: 10 INJECTION, EMULSION INTRAVENOUS at 12:49

## 2017-09-26 RX ADMIN — LIDOCAINE HYDROCHLORIDE 50 MG: 20 INJECTION, SOLUTION INFILTRATION; PERINEURAL at 12:41

## 2017-09-26 RX ADMIN — PROPOFOL 200 MCG/KG/MIN: 10 INJECTION, EMULSION INTRAVENOUS at 12:45

## 2017-09-26 RX ADMIN — SODIUM CHLORIDE, POTASSIUM CHLORIDE, SODIUM LACTATE AND CALCIUM CHLORIDE 30 ML/HR: 600; 310; 30; 20 INJECTION, SOLUTION INTRAVENOUS at 11:44

## 2017-09-26 RX ADMIN — PROPOFOL 30 MG: 10 INJECTION, EMULSION INTRAVENOUS at 12:47

## 2017-09-26 RX ADMIN — PROPOFOL 250 MG: 10 INJECTION, EMULSION INTRAVENOUS at 12:41

## 2017-09-26 RX ADMIN — PROPOFOL 50 MG: 10 INJECTION, EMULSION INTRAVENOUS at 12:43

## 2017-09-26 RX ADMIN — PROPOFOL 50 MG: 10 INJECTION, EMULSION INTRAVENOUS at 12:45

## 2017-09-26 NOTE — ANESTHESIA PREPROCEDURE EVALUATION
Anesthesia Evaluation     Patient summary reviewed and Nursing notes reviewed   NPO Solid Status: > 8 hours  NPO Liquid Status: > 8 hours     Airway   Mallampati: II  TM distance: >3 FB  Neck ROM: full  no difficulty expected  Dental    (+) poor dentition    Comment: Denies loose teeth    Pulmonary    (+) pneumonia , a smoker Current, COPD, shortness of breath, sleep apnea, rhonchi, wheezes,   Cardiovascular - normal exam    Rhythm: regular  Rate: normal    (+) hypertension, PND,       Neuro/Psych  (+) headaches, psychiatric history Anxiety and Depression,    GI/Hepatic/Renal/Endo - negative ROS     Musculoskeletal     (+) back pain,   Abdominal   (+) obese,    Substance History - negative use     OB/GYN negative ob/gyn ROS         Other   (+) arthritis                                     Anesthesia Plan    ASA 3     general     intravenous induction   Anesthetic plan and risks discussed with patient.

## 2017-09-26 NOTE — ANESTHESIA POSTPROCEDURE EVALUATION
"Patient: Jono Garza    Procedure Summary     Date Anesthesia Start Anesthesia Stop Room / Location    09/26/17 1236 1308  [facility]       Procedure Diagnosis Surgeon Provider    BRONCHOSCOPY (N/A Bronchus) No diagnosis on file. MD Snehal Jacobson MD          Anesthesia Type: general  Last vitals  BP   117/67 (09/26/17 1316)    Temp   36.4 °C (97.6 °F) (09/26/17 1306)    Pulse   86 (09/26/17 1316)   Resp   18 (09/26/17 1316)    SpO2   93 % (09/26/17 1316)      Post Anesthesia Care and Evaluation    Patient location during evaluation: PACU  Patient participation: complete - patient participated  Level of consciousness: awake  Pain score: 0  Pain management: adequate  Airway patency: patent  Anesthetic complications: No anesthetic complications    Cardiovascular status: acceptable  Respiratory status: acceptable  Hydration status: acceptable    Comments: Blood pressure 117/67, pulse 86, temperature 36.4 °C (97.6 °F), temperature source Oral, resp. rate 18, height 69\" (175.3 cm), weight 224 lb 12.8 oz (102 kg), SpO2 93 %.    No anesthesia care post op    "

## 2017-09-26 NOTE — ANESTHESIA PROCEDURE NOTES
Airway  Urgency: elective    Date/Time: 9/26/2017 12:42 PM  Airway not difficult    General Information and Staff    Patient location: [facility].  Anesthesiologist: SHERRILL REY    Indications and Patient Condition  Indications for airway management: airway protection    Preoxygenated: yes  MILS maintained throughout  Mask difficulty assessment: 1 - vent by mask    Final Airway Details  Final airway type: supraglottic airway      Successful airway: classic  Size 5    Number of attempts at approach: 1

## 2017-09-26 NOTE — PLAN OF CARE
Problem: Patient Care Overview (Adult)  Goal: Plan of Care Review  Outcome: Ongoing (interventions implemented as appropriate)    09/26/17 1116   Coping/Psychosocial Response Interventions   Plan Of Care Reviewed With patient   Patient Care Overview   Progress no change       Goal: Adult Individualization and Mutuality  Outcome: Ongoing (interventions implemented as appropriate)    09/26/17 1116   Individualization   Patient Specific Preferences none       Goal: Discharge Needs Assessment  Outcome: Ongoing (interventions implemented as appropriate)    09/26/17 1116   Discharge Needs Assessment   Concerns To Be Addressed no discharge needs identified   Living Environment   Transportation Available family or friend will provide         Problem: Bronchoscopy (Adult)  Goal: Signs and Symptoms of Listed Potential Problems Will be Absent or Manageable (Bronchoscopy)  Outcome: Ongoing (interventions implemented as appropriate)    09/26/17 1116   Bronchoscopy   Problems Present (Bronchoscopy) none

## 2017-09-26 NOTE — OP NOTE
Bronchoscopy Procedure Note    Procedure:  1. Bronchoscopy, Diagnostic  2. Bronchoalveolar lavage, BAL    Pre-Operative Diagnosis:pneumonia recurrent with bronchieactasis    Post-Operative Diagnosis: Same    Indication:    Anesthesia: Monitored Anesthesia Care (MAC)    Procedure Details: Patient was consented for the procedure with all risk and benefit of the procedure explained in detail.  Patient was given the opportunity to ask questions and all concerns were answered.  The bronchocope was inserted into the main airway via the LMA. An anatomical survey was done of the main airways and the subsegmental bronchus.  The findings are reported above.  A bronchialalveolar lavage was performed using aliquots of normal saline instilled into the RLL airways then aspirated back.    Findings:    Estimated Blood Loss:  Minimal           Specimens:  Sent purulent fluid                Complications:  None; patient tolerated the procedure well.           Disposition: PACU - hemodynamically stable.      Patient tolerated the procedure well.    Johanne Ashton MD  9/26/2017  12:51 PM

## 2017-09-27 LAB — GIE STN SPEC: NORMAL

## 2017-09-28 LAB
BACTERIA SPEC AEROBE CULT: NO GROWTH
BACTERIA SPEC RESP CULT: NO GROWTH
GRAM STN SPEC: NORMAL

## 2017-09-29 LAB
CYTO UR: NORMAL
LAB AP CASE REPORT: NORMAL
LAB AP CLINICAL INFORMATION: NORMAL
Lab: NORMAL
PATH REPORT.FINAL DX SPEC: NORMAL
PATH REPORT.GROSS SPEC: NORMAL

## 2017-10-17 ENCOUNTER — TRANSCRIBE ORDERS (OUTPATIENT)
Dept: ADMINISTRATIVE | Facility: HOSPITAL | Age: 42
End: 2017-10-17

## 2017-10-17 DIAGNOSIS — R91.1 LUNG NODULE: Primary | ICD-10-CM

## 2017-10-23 ENCOUNTER — TRANSCRIBE ORDERS (OUTPATIENT)
Dept: ADMINISTRATIVE | Facility: HOSPITAL | Age: 42
End: 2017-10-23

## 2017-10-23 DIAGNOSIS — R13.10 DYSPHAGIA, UNSPECIFIED TYPE: Primary | ICD-10-CM

## 2017-10-23 DIAGNOSIS — R05.9 COUGH: ICD-10-CM

## 2017-10-24 LAB — FUNGUS WND CULT: NORMAL

## 2017-11-01 ENCOUNTER — APPOINTMENT (OUTPATIENT)
Dept: GENERAL RADIOLOGY | Facility: HOSPITAL | Age: 42
End: 2017-11-01
Attending: INTERNAL MEDICINE

## 2017-11-07 LAB
MYCOBACTERIUM SPEC CULT: NORMAL
NIGHT BLUE STAIN TISS: NORMAL

## 2017-11-08 ENCOUNTER — HOSPITAL ENCOUNTER (OUTPATIENT)
Dept: GENERAL RADIOLOGY | Facility: HOSPITAL | Age: 42
Discharge: HOME | End: 2017-11-08
Attending: INTERNAL MEDICINE | Admitting: INTERNAL MEDICINE

## 2017-11-08 DIAGNOSIS — R05.9 COUGH: ICD-10-CM

## 2017-11-08 DIAGNOSIS — R13.10 DYSPHAGIA, UNSPECIFIED TYPE: ICD-10-CM

## 2017-11-08 PROCEDURE — 74230 X-RAY XM SWLNG FUNCJ C+: CPT

## 2017-11-08 PROCEDURE — 92611 MOTION FLUOROSCOPY/SWALLOW: CPT

## 2017-11-08 NOTE — MBS/VFSS/FEES
"Outpatient Speech Language Pathology   Adult Swallow Initial Evaluation  [facility]     Patient Name: Jono Garza  : 1975  MRN: [id number]  Today's Date: 2017        SPEECH-LANGUAGE PATHOLOGY EVALUTION - VFSS    Subjective: The patient was seen on this date for a VFSS(Videofluoroscopic Swallowing Study).  Patient was alert and cooperative.      The patient's history is significant for recurrent PNA, x7 per patient, over the past year. Pt c/o obstruction with medicine at the level of the larynx. Pt also c/o heart burn and \"going through a giant container of tums in 2 weeks\". Pt is a current smoker. Pt reports increased heart burn with red sauce and spicy foods. Pt also reports waking up at night choking, taking a drink will temporarily sooth heart burn. Oral mechanism exam revealed R lingual deviation and poor dentition.     Objective: Risks/benefits were reviewed with the patient, and consent was obtained. The study was completed with SLP present and Radiologist review. The patient was seen in lateral view(s). Textures given included thin liquid, nectar thick liquid, puree consistency, mechanical soft consistency and regular consistency.    Assessment: Difficulties were noted with none of the above consistencies. Esophageal screen was performed.     SLP Findings: Pt presents with a functional swallow. No penetration/aspiration across trials. Pt with normal hyolaryngeal elevation/excursion and swallow initiation. Mild upper esophageal backflow to pyriforms with mixed mech soft, regular, and puree; pt cleared with subsequent swallows. Pt also with mild valleculae and base of lingual residue with solids, pt cleared without cues. Esophageal screen was unremarkable. Pt c/o obstruction with pill at the level of the larynx when barium tablet passed quickly through pharynx, pt reported the sensation of obstruction cleared quickly with a liquid wash. Esophageal scan was unremarkable with barium " tablet.     Recommendations: Diet Textures: thin liquid, regular consistency food. Medications should be taken whole with thin liquids.     Recommended Strategies: reflux precautions (handout provided to patient) and Upright for PO. Oral care before breakfast, after all meals and PRN.    Other Recommended Evaluations: Referral to GI and dedicated esophageal testing as indicated      Dysphagia therapy is not recommended. Rationale: pt with a functional swallow.        Visit Date: 11/08/2017   Patient Active Problem List   Diagnosis   • Chronic bilateral low back pain with left-sided sciatica   • DDD (degenerative disc disease), lumbar   • Lumbar spondylolysis        Past Medical History:   Diagnosis Date   • Anxiety and depression    • Arthritis    • Back pain    • Bronchiectasis    • COPD (chronic obstructive pulmonary disease) 2015   • Decreased appetite    • Heartburn    • Hypertension    • Lung nodule    • Migraines    • Osteoporosis    • Pelvic fracture 2014   • Pneumonia 07/2017   • Sleep apnea         Past Surgical History:   Procedure Laterality Date   • BRONCHOSCOPY N/A 9/26/2017    Procedure: BRONCHOSCOPY;  Surgeon: Johanne Ashton MD;  Location: [facility] ENDOSCOPY;  Service:    • SKIN GRAFT  1981         Visit Dx:     ICD-10-CM ICD-9-CM   1. Dysphagia, unspecified type R13.10 787.20   2. Cough R05 786.2                   Adult Dysphagia - 11/08/17 1500     Adult Dysphagia Background    Patient Description of Complaint Recurrent PNA, c/o heart burn  -SH    Date of Onset 10/23/17 order  -    Symptoms Reported by Patient Food gets stuck  -    Patient Report of Functional Impact recurrent PNA  -SH    History Pertinent to Diagnosis recurrent PNA  -SH    Date of Onset 10/23/2017 order  -    Current Diet (Solids) Regular  -    Diet Level (Liquids) Thin Liquid  -    Foods/Liquids Presented    Method of Administration Spoon;Cup;Straw;Self-fed;Fed by examiner  -SH    SLP Communication to Radiology    Summary  Statement Pt presents with a functional swallow. Per chart review and patient report, MD concerned for possible aspiration of reflux. Pt reports recurrent PNA x7 in the past year. Pt described reflux symptoms to speech therapist prior to assessment. No penetration/aspiration across trials. Pt with normal hyolaryngeal elevation/excursion and swallow initiation. Mild upper esophageal backflow to pyriforms with mixed mech soft, regular, and puree; pt cleared with subsequent swallows. Pt also with mild valleculae and base of lingual residue with solids, pt cleared without cues. Esophageal screen was unremarkable. Pt c/o obstruction with pill at the level of the larynx when barium tablet passed quickly through pharynx, pt reported the sensation of obstruction cleared quickly with a liquid wash. Esophageal scan was unremarkable with barium tablet. SLP provided patient with reflux precautions, SLP recommends dedicated esophageal study if there is continued concern for aspiration of reflux.    -    Results/Recs/Barriers/Education for Dysphagia    Factors Affecting Performance no difficulty participating in study  -    Learning Motivation strong  -    Clinical Impression: Swallowing WNL  -    Impact on Function no impact on function  -    Recommendations for Diet/Nutirition full oral diet  -    Recommendations no dysphagia therapy indicated  -      User Key  (r) = Recorded By, (t) = Taken By, (c) = Cosigned By    Initials Name Provider Type     Modesta Garvin MS CCC-SLP Speech and Language Pathologist                            OP SLP Education       11/08/17 1532    Education    Barriers to Learning No barriers identified  -    Action Taken to Address Barriers Handouts  -    Education Provided Described results of evaluation;Patient expressed understanding of evaluation  -    Assessed Learning needs;Learning motivation;Learning preferences;Learning readiness  -    Learning Motivation Strong  -     Learning Method Teach back;Written materials;Explanation  -SH    Teaching Response Verbalized understanding  -SH      User Key  (r) = Recorded By, (t) = Taken By, (c) = Cosigned By    Initials Name Effective Dates     Modesta SAVAGE MS MONICA Garvin-SLP 07/13/17 -                     OP SLP Assessment/Plan - 11/08/17 1531     SLP Assessment    Functional Problems Swallowing  -SH    Clinical Impression: Swallowing WNL  -SH    Please refer to paper survey for additional self-reported information Yes  -SH    Please refer to items scanned into chart for additional diagnostic informaiton and handouts as provided by clinician Yes  -SH    SLP Diagnosis Pt with a functional swallow  -SH    Patient/caregiver participated in establishment of treatment plan and goals Yes  -SH    Patient would benefit from skilled therapy intervention Yes  -SH    SLP Plan    Frequency NA  -SH    Duration NA  -SH    Plan Comments NA  -SH      User Key  (r) = Recorded By, (t) = Taken By, (c) = Cosigned By    Initials Name Provider Type     Modesta Garvin, MS CCC-SLP Speech and Language Pathologist              SLP Outcome Measures (last 72 hours)      SLP Outcome Measures       11/08/17 1500          SLP Outcome Measures    Outcome Measure Used? Adult NOMS  -SH      FCM Scores    FCM Chosen Swallowing  -SH      Swallowing FCM Score 7  -SH        User Key  (r) = Recorded By, (t) = Taken By, (c) = Cosigned By    Initials Name Effective Dates     Modesta SAVAGE MS MONICA Garvin-SLP 07/13/17 -                Time Calculation:   SLP Start Time: 1300  SLP Stop Time: 1400  SLP Time Calculation (min): 60 min    Therapy Charges for Today     Code Description Service Date Service Provider Modifiers Qty    38811106521 HC ST MOTION FLUORO EVAL SWALLOW 4 11/8/2017 Modesta Garvin MS CCC-SLP GN 1                   Modesta Garvin MS CCC-SLP  11/8/2017

## 2017-11-13 ENCOUNTER — OFFICE VISIT (OUTPATIENT)
Dept: PAIN MEDICINE | Facility: CLINIC | Age: 42
End: 2017-11-13

## 2017-11-13 VITALS
OXYGEN SATURATION: 98 % | WEIGHT: 228.4 LBS | RESPIRATION RATE: 16 BRPM | HEART RATE: 98 BPM | HEIGHT: 69 IN | BODY MASS INDEX: 33.83 KG/M2 | DIASTOLIC BLOOD PRESSURE: 83 MMHG | SYSTOLIC BLOOD PRESSURE: 135 MMHG | TEMPERATURE: 97.7 F

## 2017-11-13 DIAGNOSIS — M54.42 CHRONIC BILATERAL LOW BACK PAIN WITH BILATERAL SCIATICA: ICD-10-CM

## 2017-11-13 DIAGNOSIS — M43.06 LUMBAR SPONDYLOLYSIS: Primary | ICD-10-CM

## 2017-11-13 DIAGNOSIS — M54.41 CHRONIC BILATERAL LOW BACK PAIN WITH BILATERAL SCIATICA: ICD-10-CM

## 2017-11-13 DIAGNOSIS — M51.36 DDD (DEGENERATIVE DISC DISEASE), LUMBAR: ICD-10-CM

## 2017-11-13 DIAGNOSIS — M54.42 CHRONIC BILATERAL LOW BACK PAIN WITH LEFT-SIDED SCIATICA: ICD-10-CM

## 2017-11-13 DIAGNOSIS — G89.29 CHRONIC BILATERAL LOW BACK PAIN WITH BILATERAL SCIATICA: ICD-10-CM

## 2017-11-13 DIAGNOSIS — G89.29 CHRONIC BILATERAL LOW BACK PAIN WITH LEFT-SIDED SCIATICA: ICD-10-CM

## 2017-11-13 LAB
POC AMPHETAMINES: NEGATIVE
POC BARBITURATES: NEGATIVE
POC BENZODIAZEPHINES: NEGATIVE
POC COCAINE: NEGATIVE
POC METHADONE: NEGATIVE
POC METHAMPHETAMINE SCREEN URINE: NEGATIVE
POC OPIATES: POSITIVE
POC OXYCODONE: NEGATIVE
POC PHENCYCLIDINE: NEGATIVE
POC PROPOXYPHENE: NEGATIVE
POC THC: NEGATIVE
POC TRICYCLIC ANTIDEPRESSANTS: POSITIVE

## 2017-11-13 PROCEDURE — 80305 DRUG TEST PRSMV DIR OPT OBS: CPT | Performed by: PAIN MEDICINE

## 2017-11-13 PROCEDURE — 99213 OFFICE O/P EST LOW 20 MIN: CPT | Performed by: PAIN MEDICINE

## 2017-11-13 RX ORDER — HYDROCODONE BITARTRATE AND ACETAMINOPHEN 7.5; 325 MG/1; MG/1
1 TABLET ORAL 3 TIMES DAILY PRN
Qty: 90 TABLET | Refills: 0 | Status: SHIPPED | OUTPATIENT
Start: 2017-11-13 | End: 2017-11-13 | Stop reason: SDUPTHER

## 2017-11-13 RX ORDER — HYDROCODONE BITARTRATE AND ACETAMINOPHEN 7.5; 325 MG/1; MG/1
1 TABLET ORAL 3 TIMES DAILY PRN
Qty: 90 TABLET | Refills: 0 | Status: SHIPPED | OUTPATIENT
Start: 2017-11-13 | End: 2018-01-09 | Stop reason: SDUPTHER

## 2017-11-13 RX ORDER — GABAPENTIN 600 MG/1
600 TABLET ORAL 3 TIMES DAILY
Qty: 90 TABLET | Refills: 5 | Status: SHIPPED | OUTPATIENT
Start: 2017-11-13 | End: 2018-05-10 | Stop reason: SDUPTHER

## 2017-11-13 NOTE — PROGRESS NOTES
CHIEF COMPLAINT: Back Pain    HPI  Jono Garza is a 42 y.o. male.  He is here to follow up for Back Pain    Since last visit their pain has remain unchanged. Changing of weather has increased pain lately. WC injury was in 2014. Case settled. Stable on medication. 50-60% pain relief. Wants me to take over gabapentin medication. Has been out for 2 weeks, trouble getting into PCP to get filled.  Since last visit, started on CPAP.      Worse pain today is in low back. The patient states their pain is a 4 on a scale of 1-10.  The patient describes this pain as episodic dull, ache, sharp, shooting and pressure.  The pain is located in bilateral low back and does radiate posterior bilateral leg to knees. This painful problem is aggravated by physical activity, twisting, bending and movement and is alleviated by past injection, pain medication and relaxation.     Past pain medications: Hydrocodone 5/325 and 7.5/325 QID - not very effective  Hydrocodone 10/325 mg qid - effective - decrease pain level down to a 2-3. Able to be more active, perform more daily activities.   Mobic 15 mg daily - helping      Current pain medications:   hydro/apap 7.5-325 mg TID  diclofenac-misoprostol 50-0.2 mg bid prn - only taken for 4-5 days- minimal help but doesn't hurt stomach when taking.   Gabapentin 600 mg BID - helping  Elavil 50 mg qhs - helping him sleep      Past therapies:  Physical Therapy: yes - last visit was over 1 year ago.   Chiropractor: yes  Massage Therapy: no  TENS: yes - used at PT and chiropractor  Neck or back surgery: no      Previous Injection: Lumbar Epidural Steroid Injection at  L5/S1 on 8/23/17  Effect of Injection (%): 15%  Length of Relief: 3-5 days      Previous Injections: yes, in his lower back at [facility]- LESI x 3 - minimal benefit.   Effect of Injection (%): 0%, he said if it had any affect it did not last long    PEG Assessment   What number best describes your pain on average in  the past week? 4-5  What number best describes how, during the past week, pain has interfered with your enjoyment of life? 4-5  What number best describes how, during the past week, pain has interfered with your general activity? 4-5      Current Outpatient Prescriptions:   •  albuterol (PROVENTIL HFA;VENTOLIN HFA) 108 (90 Base) MCG/ACT inhaler, Inhale 2 puffs Every 4 (Four) Hours As Needed for Wheezing., Disp: , Rfl:   •  alendronate (FOSAMAX) 5 MG tablet, Take 5 mg by mouth Every Morning Before Breakfast., Disp: , Rfl:   •  amitriptyline (ELAVIL) 50 MG tablet, Take 50 mg by mouth Every Night., Disp: , Rfl:   •  diclofenac-misoprostol (ARTHROTEC 50) 50-0.2 MG EC tablet, TAKE ONE TABLET BY MOUTH TWICE A DAY, Disp: 60 tablet, Rfl: 5  •  Ergocalciferol (VITAMIN D2 PO), Take 125 mg by mouth 1 (One) Time Per Week., Disp: , Rfl:   •  fluconazole (DIFLUCAN) 100 MG tablet, Take 50 mg by mouth 2 (Two) Times a Day., Disp: , Rfl:   •  Fluticasone Furoate-Vilanterol (BREO ELLIPTA) 100-25 MCG/INH aerosol powder , Inhale 1 puff Daily., Disp: , Rfl:   •  gabapentin (NEURONTIN) 600 MG tablet, Take 600 mg by mouth 3 (Three) Times a Day., Disp: , Rfl:   •  HYDROcodone-acetaminophen (NORCO) 7.5-325 MG per tablet, Take 1 tablet by mouth 3 (Three) Times a Day As Needed (pain)., Disp: 90 tablet, Rfl: 0  •  lisinopril (PRINIVIL,ZESTRIL) 20 MG tablet, Take 20 mg by mouth Daily., Disp: , Rfl:   •  Loratadine 10 MG capsule, Take 10 mg by mouth Daily., Disp: , Rfl:   •  meloxicam (MOBIC) 15 MG tablet, Take 15 mg by mouth Daily., Disp: , Rfl:   •  pramipexole (MIRAPEX) 0.125 MG tablet, Take 0.125 mg by mouth 3 (Three) Times a Day., Disp: , Rfl:   •  theophylline (STEPHANIA-24) 300 MG 24 hr capsule, Take 150 mg by mouth 2 (Two) Times a Day., Disp: , Rfl:   •  Umeclidinium Bromide (INCRUSE ELLIPTA) 62.5 MCG/INH aerosol powder , Inhale 1 puff Daily., Disp: , Rfl:     IMAGING  MRI femur 7/27/16  IMPRESSION: Normal MRI of the thigh and femur.  Incidental benign lesion noted in the distal femoral shaft as detailed above.       MRI lumbar spine 3/15/16 - in [place] - no results posted - patient states he had it performed, will try to obtain.  Summary from the [facility] note the lumbar MRI showed:  1. Old superior L1 vertebral body compression fracture no associated retropulsion of vertebral body  2. Mild T12-L1 disc degeneration.  3. Mild L5-S1 disc degeneration producing mild right-sided stenosis      Mri 12/2014:  COMMENT: MRI of the lumbar spine performed without contrast using  routine 1.5T imaging technique. The outside CT scan from 03/28/2014 has  been acquired for comparison.  Sagittal alignment is normal. Chronic mild anterior wedging is seen at  L1 and to a lesser extent at T12 with large Schmorl's nodes at the  superior endplates. The conus medullaris terminates at L1-2 and is  within normal limits. There is mild intervertebral disk desiccation at  T11-12, T12-L1 and L5-S1 with mild associated loss of intervertebral  disk height. There is a small posterior annular fissure at the 5-1  level. There is some marrow signal abnormality seen at the right side  of the sacrum S1 level. This corresponds to the site of the fracture on  the study from 03/28/2014 and is probably related to fracture healing.  Please refer to separate MRI pelvis report. CT scanning is more  sensitive than MRI for discrete fracture line. Otherwise, marrow signal  intensity is unremarkable.  At T12-L1, there is mild concentric disk bulge with mild effacement of  the anterior thecal sac. There is no foraminal impingement.  At L1-2, there is no significant abnormality. No canal or foraminal  impingement.  At L2-3, no significant abnormality.  At L3-4, mild facet hypertrophy. No canal or foraminal compromise.  At L4-5, mild facet degenerative change bilaterally, left more so than  right. No canal stenosis or foraminal impingement.  At L5-S1, mild facet degenerative  change bilaterally. Mild broad-based  posterior disk bulge with a superimposed extrusion in a right paramedian  location extending slightly caudad from the level of the disk resulting  contiguous with it resulting in mild mass effect on the right lateral  recess, expected location of the right S1 root. There is however no  significant central canal stenosis. There is mild right-sided foraminal  narrowing and only minimal left foraminal narrowing.  IMPRESSION-  1. There is some marrow edema in the right sacral ala at site of  previously diagnosed fracture. This is probably related to fracture  healing, but please correlate with the pelvic MRI and please be aware  that CT scanning is more sensitive than MRI for assessment of the  fracture line itself.  2. At the L5-S1 level, there is a rightward disk extrusion with some  mild mass effect on the right lateral recess expected location of the  right S1 root, but there is no central canal stenosis. Please refer to  the multiple level description and level-by-level discussion above.  3. Chronic anterior wedging at T12 and L1. No acute compression  Fracture.      Lumbar x-rays with flexion extension views 3/31/2016:  1. Stable chronic mild superior L1 vertebral body compression deformity, no acute fracture   2. New amount posterior L5 disc narrowing likely due to early degenerative change  3. Diffuse osteopenia greater than expected for age  4. Decreased range of motion      EMG on 3/31/2016:  1. Normal study    PFSH:  The following portions of the patient's history were reviewed and updated as appropriate: problem list, past medical history, past surgery history, social history, family history, medications, and allergies    Review of Systems   Constitutional: Negative for chills, fatigue and fever.   HENT: Positive for congestion.    Eyes: Negative for visual disturbance.   Respiratory: Positive for cough, shortness of breath and wheezing.    Cardiovascular: Positive for  "leg swelling (left thigh). Negative for chest pain and palpitations.   Gastrointestinal: Negative for constipation and diarrhea.   Genitourinary: Negative for difficulty urinating.   Musculoskeletal: Positive for back pain.   Skin: Negative for rash and wound.   Allergic/Immunologic: Negative for immunocompromised state.   Neurological: Positive for weakness, numbness and headaches. Negative for dizziness and light-headedness.   Hematological: Does not bruise/bleed easily.   Psychiatric/Behavioral: Positive for sleep disturbance. Negative for agitation, confusion, hallucinations and suicidal ideas. The patient is nervous/anxious.    All other systems reviewed and are negative.      Vitals:    11/13/17 1259   BP: 135/83   Pulse: 98   Resp: 16   Temp: 97.7 °F (36.5 °C)   SpO2: 98%   Weight: 228 lb 6.4 oz (104 kg)   Height: 69\" (175.3 cm)   PainSc:   4   PainLoc: Back       Physical Exam   Constitutional: He is oriented to person, place, and time. He appears well-developed and well-nourished. No distress.   HENT:   Head: Normocephalic and atraumatic.   Nose: Nose normal.   Mouth/Throat: Oropharynx is clear and moist.   Eyes: Conjunctivae and EOM are normal.   Neck: Normal range of motion. Neck supple.   Pulmonary/Chest: Effort normal. No stridor. No respiratory distress.   Musculoskeletal:        Right hip: He exhibits tenderness.        Left hip: He exhibits tenderness.        Lumbar back: He exhibits tenderness and pain.   +bilateral lumbar facet tenderness  +bilateral lumbar facet loading   +TTP over sacral and pelvic bones bilaterally      Neurological: He is alert and oriented to person, place, and time. No cranial nerve deficit or sensory deficit.   Skin: Skin is warm and dry. No rash noted. He is not diaphoretic.   Psychiatric: He has a normal mood and affect. His speech is normal and behavior is normal.   Nursing note and vitals reviewed.    Ortho Exam  Neurologic Exam     Mental Status   Oriented to person, " place, and time.   Speech: speech is normal     Cranial Nerves     CN III, IV, VI   Extraocular motions are normal.       Lab Results   Component Value Date    POCMETH Negative 07/14/2017    POCAMPHET Negative 07/14/2017    POCBARBITUR Negative 07/14/2017    POCBENZO Negative 07/14/2017    POCCOCAINE Negative 07/14/2017    POCMETHADO Negative 07/14/2017    POCOPIATES Positive (A) 07/14/2017    POCOXYCODO Positive (A) 07/14/2017    POCPHENCYC Negative 07/14/2017    POCPROPOXY Negative 07/14/2017    POCTHC Negative 07/14/2017    POCTRICYC Positive (A) 07/14/2017     Last UDS results reviewed: 11/13/17   Last UDS: 7/18/17  Comments: Consistent       Date of last LUISA reviewed : 11/13/17   Comments: Consistent     Assessment/Plan   Jono was seen today for back pain.    Diagnoses and all orders for this visit:    Lumbar spondylolysis    Chronic bilateral low back pain with left-sided sciatica    DDD (degenerative disc disease), lumbar  -     Discontinue: HYDROcodone-acetaminophen (NORCO) 7.5-325 MG per tablet; Take 1 tablet by mouth 3 (Three) Times a Day As Needed (pain).  -     HYDROcodone-acetaminophen (NORCO) 7.5-325 MG per tablet; Take 1 tablet by mouth 3 (Three) Times a Day As Needed (pain).    Chronic bilateral low back pain with bilateral sciatica  -     Discontinue: HYDROcodone-acetaminophen (NORCO) 7.5-325 MG per tablet; Take 1 tablet by mouth 3 (Three) Times a Day As Needed (pain).  -     HYDROcodone-acetaminophen (NORCO) 7.5-325 MG per tablet; Take 1 tablet by mouth 3 (Three) Times a Day As Needed (pain).    Requested Prescriptions     Signed Prescriptions Disp Refills   • HYDROcodone-acetaminophen (NORCO) 7.5-325 MG per tablet 90 tablet 0     Sig: Take 1 tablet by mouth 3 (Three) Times a Day As Needed (pain).     - Patient appears stable with current regimen. No adverse effects. Regarding continuation of opioids, there is no evidence of aberrant behavior or any red flags. The patient continues with  appropriate response to opioid therapy. ADL's remain intact by self.    - CONTINUE norco 7.5/325 mg tid prn pain. (#90, 1 refills)   - Two months prescriptions given today. Appropriate DNF dates applied.   - Last UDS performed was reviewed and consistent.   - Random urine drug screen per office policy today, to be checked at next visit. Given the patient had been dismissed from a previous pain clinic, we'll continue to closely monitor the patient for any abuse or misuse.  - Minimal benefit with LESI, can repeat in future to see if he gets any more benefit. Will hold off for now.   - Will take over gabapentin medication. Must be in UDS from now on. Currently taking bid. Will increase tid. Continue gabapentin 600 mg tid - helping, (#90, 5 refills)      - Continue diclofenac-misoprostol 50-0.2 mg. No refills needed today.   - Continue home exercise program.   -  Given his severe pain that is not responsive to conservative therapy, patient would likely benefit from neuromodulation. He does not want to trial at this time.   - importance of wearing CPAP, especially while on opioid medication.     Wt Readings from Last 3 Encounters:   11/13/17 228 lb 6.4 oz (104 kg)   09/26/17 224 lb 12.8 oz (102 kg)   09/14/17 225 lb 12.8 oz (102 kg)     Body mass index is 33.73 kg/(m^2). weight gain of 3 lbs over the past  2 month(s)    Intentional?  No Patient counseled on the importance of weight loss to help with overall health and pain control. Patient instructed to attempt weight loss.   Plan: Calorie counting  increase physical activity, reduce portion size and cut out extra servings    Follow-up in 2 months.    Chante Duarte MD  Pain Management      [facility] REPORT  As part of the patient's treatment plan, I am prescribing controlled substances. The patient has been made aware of appropriate use of such medications, including potential risk of somnolence, limited ability to drive and/or work safely, and the potential for  dependence or overdose. It has also bee made clear that these medications are for use by this patient only, without concomitant use of alcohol or other substances unless prescribed.   Patient has completed prescribing agreement detailing terms of continued prescribing of controlled substances, including monitoring LUISA reports, urine drug screening, and pill counts if necessary. The patient is aware that inappropriate use will results in cessation of prescribing such medications.  LUISA report has been reviewed and scanned into the patient's chart.  History and physical exam exhibit continued safe and appropriate use of controlled substances.

## 2017-11-28 ENCOUNTER — OFFICE VISIT (OUTPATIENT)
Dept: CARDIOLOGY | Facility: CLINIC | Age: 42
End: 2017-11-28

## 2017-11-28 VITALS
WEIGHT: 228 LBS | BODY MASS INDEX: 33.77 KG/M2 | HEIGHT: 69 IN | DIASTOLIC BLOOD PRESSURE: 84 MMHG | HEART RATE: 89 BPM | SYSTOLIC BLOOD PRESSURE: 122 MMHG

## 2017-11-28 DIAGNOSIS — I10 ESSENTIAL HYPERTENSION: ICD-10-CM

## 2017-11-28 DIAGNOSIS — I47.1 SVT (SUPRAVENTRICULAR TACHYCARDIA) (HCC): ICD-10-CM

## 2017-11-28 DIAGNOSIS — Z72.0 TOBACCO ABUSE: Primary | ICD-10-CM

## 2017-11-28 PROCEDURE — 93000 ELECTROCARDIOGRAM COMPLETE: CPT | Performed by: INTERNAL MEDICINE

## 2017-11-28 PROCEDURE — 99203 OFFICE O/P NEW LOW 30 MIN: CPT | Performed by: INTERNAL MEDICINE

## 2017-11-28 RX ORDER — BISOPROLOL FUMARATE AND HYDROCHLOROTHIAZIDE 5; 6.25 MG/1; MG/1
1 TABLET ORAL DAILY
Qty: 90 TABLET | Refills: 3 | Status: SHIPPED | OUTPATIENT
Start: 2017-11-28 | End: 2018-11-20 | Stop reason: SDUPTHER

## 2017-12-11 PROBLEM — I47.10 SVT (SUPRAVENTRICULAR TACHYCARDIA): Status: ACTIVE | Noted: 2017-12-11

## 2017-12-11 PROBLEM — I10 ESSENTIAL HYPERTENSION: Status: ACTIVE | Noted: 2017-12-11

## 2017-12-11 PROBLEM — Z72.0 TOBACCO ABUSE: Status: ACTIVE | Noted: 2017-12-11

## 2017-12-11 PROBLEM — I47.1 SVT (SUPRAVENTRICULAR TACHYCARDIA) (HCC): Status: ACTIVE | Noted: 2017-12-11

## 2017-12-26 ENCOUNTER — HOSPITAL ENCOUNTER (OUTPATIENT)
Dept: CARDIOLOGY | Facility: HOSPITAL | Age: 42
Discharge: HOME | End: 2017-12-26
Attending: INTERNAL MEDICINE | Admitting: INTERNAL MEDICINE

## 2017-12-26 VITALS
HEART RATE: 74 BPM | OXYGEN SATURATION: 95 % | DIASTOLIC BLOOD PRESSURE: 58 MMHG | WEIGHT: 227.07 LBS | SYSTOLIC BLOOD PRESSURE: 115 MMHG | BODY MASS INDEX: 33.63 KG/M2 | HEIGHT: 69 IN

## 2017-12-26 PROCEDURE — 0399T ADULT TRANSTHORACIC ECHO COMPLETE W/ CONT IF NECESSARY PER PROTOCOL: CPT | Performed by: INTERNAL MEDICINE

## 2017-12-26 PROCEDURE — 93306 TTE W/DOPPLER COMPLETE: CPT

## 2017-12-26 PROCEDURE — 93306 TTE W/DOPPLER COMPLETE: CPT | Performed by: INTERNAL MEDICINE

## 2017-12-26 PROCEDURE — 0399T HC MYOCARDL STRAIN IMAG QUAN ASSMT PER SESS: CPT

## 2017-12-27 LAB
BH CV ECHO MEAS - ACS: 2.4 CM
BH CV ECHO MEAS - AO MAX PG (FULL): 0.5 MMHG
BH CV ECHO MEAS - AO MAX PG: 4.7 MMHG
BH CV ECHO MEAS - AO MEAN PG (FULL): 0 MMHG
BH CV ECHO MEAS - AO MEAN PG: 2 MMHG
BH CV ECHO MEAS - AO ROOT AREA (BSA CORRECTED): 1.6
BH CV ECHO MEAS - AO ROOT AREA: 10.2 CM^2
BH CV ECHO MEAS - AO ROOT DIAM: 3.6 CM
BH CV ECHO MEAS - AO V2 MAX: 108 CM/SEC
BH CV ECHO MEAS - AO V2 MEAN: 67.5 CM/SEC
BH CV ECHO MEAS - AO V2 VTI: 23.4 CM
BH CV ECHO MEAS - AVA(I,A): 3.2 CM^2
BH CV ECHO MEAS - AVA(I,D): 3.2 CM^2
BH CV ECHO MEAS - AVA(V,A): 3.6 CM^2
BH CV ECHO MEAS - AVA(V,D): 3.6 CM^2
BH CV ECHO MEAS - BSA(HAYCOCK): 2.3 M^2
BH CV ECHO MEAS - BSA: 2.2 M^2
BH CV ECHO MEAS - BZI_BMI: 33.7 KILOGRAMS/M^2
BH CV ECHO MEAS - BZI_METRIC_HEIGHT: 175.3 CM
BH CV ECHO MEAS - BZI_METRIC_WEIGHT: 103.4 KG
BH CV ECHO MEAS - CONTRAST EF (2CH): 56.6 ML/M^2
BH CV ECHO MEAS - CONTRAST EF 4CH: 61.3 ML/M^2
BH CV ECHO MEAS - EDV(CUBED): 152.3 ML
BH CV ECHO MEAS - EDV(MOD-SP2): 104 ML
BH CV ECHO MEAS - EDV(MOD-SP4): 99.1 ML
BH CV ECHO MEAS - EDV(TEICH): 137.7 ML
BH CV ECHO MEAS - EF(CUBED): 73.7 %
BH CV ECHO MEAS - EF(MOD-SP2): 56.6 %
BH CV ECHO MEAS - EF(MOD-SP4): 61.3 %
BH CV ECHO MEAS - EF(TEICH): 65.1 %
BH CV ECHO MEAS - ESV(CUBED): 40 ML
BH CV ECHO MEAS - ESV(MOD-SP2): 45.1 ML
BH CV ECHO MEAS - ESV(MOD-SP4): 38.4 ML
BH CV ECHO MEAS - ESV(TEICH): 48.1 ML
BH CV ECHO MEAS - FS: 36 %
BH CV ECHO MEAS - IVS/LVPW: 1
BH CV ECHO MEAS - IVSD: 0.91 CM
BH CV ECHO MEAS - LA DIMENSION: 3.9 CM
BH CV ECHO MEAS - LA/AO: 1.1
BH CV ECHO MEAS - LAT PEAK E' VEL: 12 CM/SEC
BH CV ECHO MEAS - LV DIASTOLIC VOL/BSA (35-75): 45.4 ML/M^2
BH CV ECHO MEAS - LV MASS(C)D: 175.9 GRAMS
BH CV ECHO MEAS - LV MASS(C)DI: 80.5 GRAMS/M^2
BH CV ECHO MEAS - LV MAX PG: 4.2 MMHG
BH CV ECHO MEAS - LV MEAN PG: 2 MMHG
BH CV ECHO MEAS - LV SYSTOLIC VOL/BSA (12-30): 17.6 ML/M^2
BH CV ECHO MEAS - LV V1 MAX: 102 CM/SEC
BH CV ECHO MEAS - LV V1 MEAN: 62.2 CM/SEC
BH CV ECHO MEAS - LV V1 VTI: 20 CM
BH CV ECHO MEAS - LVIDD: 5.3 CM
BH CV ECHO MEAS - LVIDS: 3.4 CM
BH CV ECHO MEAS - LVLD AP2: 9.3 CM
BH CV ECHO MEAS - LVLD AP4: 9 CM
BH CV ECHO MEAS - LVLS AP2: 7.2 CM
BH CV ECHO MEAS - LVLS AP4: 7.3 CM
BH CV ECHO MEAS - LVOT AREA (M): 3.8 CM^2
BH CV ECHO MEAS - LVOT AREA: 3.8 CM^2
BH CV ECHO MEAS - LVOT DIAM: 2.2 CM
BH CV ECHO MEAS - LVPWD: 0.88 CM
BH CV ECHO MEAS - MED PEAK E' VEL: 11 CM/SEC
BH CV ECHO MEAS - MV A DUR: 0.17 SEC
BH CV ECHO MEAS - MV A MAX VEL: 65 CM/SEC
BH CV ECHO MEAS - MV DEC SLOPE: 392 CM/SEC^2
BH CV ECHO MEAS - MV DEC TIME: 0.2 SEC
BH CV ECHO MEAS - MV E MAX VEL: 76.6 CM/SEC
BH CV ECHO MEAS - MV E/A: 1.2
BH CV ECHO MEAS - MV MAX PG: 2.9 MMHG
BH CV ECHO MEAS - MV MEAN PG: 1 MMHG
BH CV ECHO MEAS - MV P1/2T MAX VEL: 85.8 CM/SEC
BH CV ECHO MEAS - MV P1/2T: 64.1 MSEC
BH CV ECHO MEAS - MV V2 MAX: 85.1 CM/SEC
BH CV ECHO MEAS - MV V2 MEAN: 43.9 CM/SEC
BH CV ECHO MEAS - MV V2 VTI: 22.2 CM
BH CV ECHO MEAS - MVA P1/2T LCG: 2.6 CM^2
BH CV ECHO MEAS - MVA(P1/2T): 3.4 CM^2
BH CV ECHO MEAS - MVA(VTI): 3.4 CM^2
BH CV ECHO MEAS - PA ACC TIME: 0.12 SEC
BH CV ECHO MEAS - PA MAX PG (FULL): 1.8 MMHG
BH CV ECHO MEAS - PA MAX PG: 3.1 MMHG
BH CV ECHO MEAS - PA PR(ACCEL): 25 MMHG
BH CV ECHO MEAS - PA V2 MAX: 87.7 CM/SEC
BH CV ECHO MEAS - PULM A REVS DUR: 0.16 SEC
BH CV ECHO MEAS - PULM A REVS VEL: 28.7 CM/SEC
BH CV ECHO MEAS - PULM DIAS VEL: 60.8 CM/SEC
BH CV ECHO MEAS - PULM S/D: 1.2
BH CV ECHO MEAS - PULM SYS VEL: 71.3 CM/SEC
BH CV ECHO MEAS - PVA(V,A): 2.9 CM^2
BH CV ECHO MEAS - PVA(V,D): 2.9 CM^2
BH CV ECHO MEAS - QP/QS: 0.63
BH CV ECHO MEAS - RV MAX PG: 1.2 MMHG
BH CV ECHO MEAS - RV MEAN PG: 1 MMHG
BH CV ECHO MEAS - RV V1 MAX: 55.4 CM/SEC
BH CV ECHO MEAS - RV V1 MEAN: 35.7 CM/SEC
BH CV ECHO MEAS - RV V1 VTI: 10.6 CM
BH CV ECHO MEAS - RVOT AREA: 4.5 CM^2
BH CV ECHO MEAS - RVOT DIAM: 2.4 CM
BH CV ECHO MEAS - SI(AO): 109.1 ML/M^2
BH CV ECHO MEAS - SI(CUBED): 51.4 ML/M^2
BH CV ECHO MEAS - SI(LVOT): 34.8 ML/M^2
BH CV ECHO MEAS - SI(MOD-SP2): 27 ML/M^2
BH CV ECHO MEAS - SI(MOD-SP4): 27.8 ML/M^2
BH CV ECHO MEAS - SI(TEICH): 41 ML/M^2
BH CV ECHO MEAS - SV(AO): 238.2 ML
BH CV ECHO MEAS - SV(CUBED): 112.3 ML
BH CV ECHO MEAS - SV(LVOT): 76 ML
BH CV ECHO MEAS - SV(MOD-SP2): 58.9 ML
BH CV ECHO MEAS - SV(MOD-SP4): 60.7 ML
BH CV ECHO MEAS - SV(RVOT): 48 ML
BH CV ECHO MEAS - SV(TEICH): 89.6 ML
BH CV ECHO MEAS - TAPSE (>1.6): 2 CM2
BH CV VAS BP RIGHT ARM: NORMAL MMHG
BH CV XLRA - RV BASE: 3.5 CM
BH CV XLRA - RV LENGTH: 8.2 CM
BH CV XLRA - RV MID: 2.4 CM
BH CV XLRA - TDI S': 12 CM/SEC
E/E' RATIO: 7
LEFT ATRIUM VOLUME INDEX: 20 ML/M2
LEFT ATRIUM VOLUME: 42 CM3
MAXIMAL PREDICTED HEART RATE: 179 BPM
STRESS TARGET HR: 152 BPM

## 2017-12-28 ENCOUNTER — OFFICE VISIT (OUTPATIENT)
Dept: CARDIOLOGY | Facility: CLINIC | Age: 42
End: 2017-12-28

## 2017-12-28 VITALS
WEIGHT: 232 LBS | HEART RATE: 80 BPM | BODY MASS INDEX: 34.36 KG/M2 | SYSTOLIC BLOOD PRESSURE: 106 MMHG | HEIGHT: 69 IN | DIASTOLIC BLOOD PRESSURE: 73 MMHG

## 2017-12-28 DIAGNOSIS — I10 ESSENTIAL HYPERTENSION: Primary | ICD-10-CM

## 2017-12-28 DIAGNOSIS — I47.1 SVT (SUPRAVENTRICULAR TACHYCARDIA) (HCC): ICD-10-CM

## 2017-12-28 DIAGNOSIS — Z72.0 TOBACCO ABUSE: ICD-10-CM

## 2017-12-28 PROCEDURE — 99213 OFFICE O/P EST LOW 20 MIN: CPT | Performed by: INTERNAL MEDICINE

## 2017-12-28 NOTE — PROGRESS NOTES
" Subjective:       Jono Garza is a 42 y.o. male who here for follow up    CC  The follow-up after the stress test and echocardiogram  HPI  42-year-old male with episodes of the supraventricular tachycardia underwent a stress test and echocardiogramwhich are within the normal limits.  Recently still have occasional SVT mild in nature associated with shortness of breath     Problem List Items Addressed This Visit        Cardiovascular and Mediastinum    Essential hypertension - Primary    SVT (supraventricular tachycardia)       Other    Tobacco abuse        .    The following portions of the patient's history were reviewed and updated as appropriate: allergies, current medications, past family history, past medical history, past social history, past surgical history and problem list.    Past Medical History:   Diagnosis Date   • Anxiety and depression    • Arthritis    • Back pain    • Bronchiectasis    • COPD (chronic obstructive pulmonary disease) 2015   • Decreased appetite    • Heartburn    • Hypertension    • Lung nodule    • Migraines    • Osteoporosis    • Pelvic fracture 2014   • Pneumonia 07/2017   • Sleep apnea     reports that he has quit smoking. He started smoking about 29 years ago. He has a 9.50 pack-year smoking history. He has never used smokeless tobacco. He reports that he does not drink alcohol or use illicit drugs.  Family History   Problem Relation Age of Onset   • Lung cancer Maternal Aunt    • Colon cancer Maternal Uncle    • Lung cancer Maternal Grandfather    • Malig Hyperthermia Neg Hx        Review of Systems  Constitutional: No wt loss, fever, fatigue  Gastrointestinal: No nausea, abdominal pain  Behavioral/Psych: No insomnia or anxiety   Cardiovascular palpitation  Objective:       Physical Exam             Physical Exam  /73  Pulse 80  Ht 175.3 cm (69\")  Wt 105 kg (232 lb)  BMI 34.26 kg/m2    General appearance: NAD, conversant   Eyes: anicteric sclerae, moist " conjunctivae; no lid-lag; PERRLA   HENT: Atraumatic; oropharynx clear with moist mucous membranes and no mucosal ulcerations;  normal hard and soft palate   Neck: Trachea midline; FROM, supple, no thyromegaly or lymphadenopathy   Lungs: CTA, with normal respiratory effort and no intercostal retractions   CV: S1-S2 regular, no murmurs, no rub, no gallop   Abdomen: Soft, non-tender; no masses or HSM   Extremities: No peripheral edema or extremity lymphadenopathy  Skin: Normal temperature, turgor and texture; no rash, ulcers or subcutaneous nodules   Psych: Appropriate affect, alert and oriented to person, place and time           Cardiographics  @Procedures    Echocardiogram:        Current Outpatient Prescriptions:   •  albuterol (PROVENTIL HFA;VENTOLIN HFA) 108 (90 Base) MCG/ACT inhaler, Inhale 2 puffs Every 4 (Four) Hours As Needed for Wheezing., Disp: , Rfl:   •  alendronate (FOSAMAX) 5 MG tablet, Take 5 mg by mouth Every Morning Before Breakfast., Disp: , Rfl:   •  amitriptyline (ELAVIL) 50 MG tablet, Take 50 mg by mouth Every Night., Disp: , Rfl:   •  bisoprolol-hydrochlorothiazide (ZIAC) 5-6.25 MG per tablet, Take 1 tablet by mouth Daily., Disp: 90 tablet, Rfl: 3  •  diclofenac-misoprostol (ARTHROTEC 50) 50-0.2 MG EC tablet, TAKE ONE TABLET BY MOUTH TWICE A DAY, Disp: 60 tablet, Rfl: 5  •  Ergocalciferol (VITAMIN D2 PO), Take 125 mg by mouth 1 (One) Time Per Week., Disp: , Rfl:   •  fluconazole (DIFLUCAN) 100 MG tablet, Take 50 mg by mouth 2 (Two) Times a Day., Disp: , Rfl:   •  Fluticasone Furoate-Vilanterol (BREO ELLIPTA) 100-25 MCG/INH aerosol powder , Inhale 1 puff Daily., Disp: , Rfl:   •  gabapentin (NEURONTIN) 600 MG tablet, Take 1 tablet by mouth 3 (Three) Times a Day., Disp: 90 tablet, Rfl: 5  •  HYDROcodone-acetaminophen (NORCO) 7.5-325 MG per tablet, Take 1 tablet by mouth 3 (Three) Times a Day As Needed (pain)., Disp: 90 tablet, Rfl: 0  •  Loratadine 10 MG capsule, Take 10 mg by mouth Daily., Disp:  , Rfl:   •  meloxicam (MOBIC) 15 MG tablet, Take 15 mg by mouth Daily., Disp: , Rfl:   •  pramipexole (MIRAPEX) 0.125 MG tablet, Take 0.125 mg by mouth 3 (Three) Times a Day., Disp: , Rfl:   •  Umeclidinium Bromide (INCRUSE ELLIPTA) 62.5 MCG/INH aerosol powder , Inhale 1 puff Daily., Disp: , Rfl:    Assessment:        Patient Active Problem List   Diagnosis   • Chronic bilateral low back pain with left-sided sciatica   • DDD (degenerative disc disease), lumbar   • Lumbar spondylolysis   • Tobacco abuse   • Essential hypertension   • SVT (supraventricular tachycardia)         Interpretation Summary   · The left ventricular cavity is mildly dilated.  · Mild mitral valve regurgitation is present  · NORMAL GLOBAL STRAIN -22  · Left Ventricle: Calculated EF = 61.3%.  · Mild mitral valve regurgitation  · There is no evidence of pericardial effusion.            stress test neg   Plan:            ICD-10-CM ICD-9-CM   1. Essential hypertension I10 401.9   2. SVT (supraventricular tachycardia) I47.1 427.89   3. Tobacco abuse Z72.0 305.1     1. Essential hypertension  Importance of controlling hypertension and blood pressure  checkup on the regular basis has been explained  Hypertension as a silent killer has been discussed  Risk reduction of the weight and regular exercises to control the hypertension has been explained      2. SVT (supraventricular tachycardia)      3. Tobacco abuse  Has stopped recently     Specificity and sensitivity of the stress test/ cardiac workup has been explained. Pt has been explained if  Symptoms continue please go to ER, and further w/p will be required.    Also explained this does not rule out coronary artery disease or the future events, continue to emphasize on risk reductions for coronary artery disease    COUNSELING:    Jono Snider was given to patient for the following topics: diagnostic results, risk factor reductions, impressions, risks and benefits of treatment  options and importance of treatment compliance .       SMOKING COUNSELING:    Counseling given: Not Answered      EMR Dragon/Transcription disclaimer:   Much of this encounter note is an electronic transcription/translation of spoken language to printed text. The electronic translation of spoken language may permit erroneous, or at times, nonsensical words or phrases to be inadvertently transcribed; Although I have reviewed the note for such errors, some may still exist.

## 2018-01-09 ENCOUNTER — OFFICE VISIT (OUTPATIENT)
Dept: PAIN MEDICINE | Facility: CLINIC | Age: 43
End: 2018-01-09

## 2018-01-09 VITALS
TEMPERATURE: 98.1 F | DIASTOLIC BLOOD PRESSURE: 84 MMHG | WEIGHT: 236 LBS | HEART RATE: 82 BPM | OXYGEN SATURATION: 97 % | HEIGHT: 69 IN | SYSTOLIC BLOOD PRESSURE: 137 MMHG | RESPIRATION RATE: 18 BRPM | BODY MASS INDEX: 34.96 KG/M2

## 2018-01-09 DIAGNOSIS — M54.42 CHRONIC BILATERAL LOW BACK PAIN WITH BILATERAL SCIATICA: ICD-10-CM

## 2018-01-09 DIAGNOSIS — M54.42 CHRONIC BILATERAL LOW BACK PAIN WITH LEFT-SIDED SCIATICA: Primary | ICD-10-CM

## 2018-01-09 DIAGNOSIS — G89.29 CHRONIC BILATERAL LOW BACK PAIN WITH BILATERAL SCIATICA: ICD-10-CM

## 2018-01-09 DIAGNOSIS — M51.36 DDD (DEGENERATIVE DISC DISEASE), LUMBAR: ICD-10-CM

## 2018-01-09 DIAGNOSIS — M54.41 CHRONIC BILATERAL LOW BACK PAIN WITH BILATERAL SCIATICA: ICD-10-CM

## 2018-01-09 DIAGNOSIS — G89.29 CHRONIC BILATERAL LOW BACK PAIN WITH LEFT-SIDED SCIATICA: Primary | ICD-10-CM

## 2018-01-09 DIAGNOSIS — M43.06 LUMBAR SPONDYLOLYSIS: ICD-10-CM

## 2018-01-09 PROCEDURE — 99213 OFFICE O/P EST LOW 20 MIN: CPT | Performed by: PAIN MEDICINE

## 2018-01-09 RX ORDER — HYDROCODONE BITARTRATE AND ACETAMINOPHEN 7.5; 325 MG/1; MG/1
1 TABLET ORAL 3 TIMES DAILY PRN
Qty: 90 TABLET | Refills: 0 | Status: SHIPPED | OUTPATIENT
Start: 2018-01-09 | End: 2018-03-06 | Stop reason: SDUPTHER

## 2018-01-09 RX ORDER — HYDROCODONE BITARTRATE AND ACETAMINOPHEN 7.5; 325 MG/1; MG/1
1 TABLET ORAL 3 TIMES DAILY PRN
Qty: 90 TABLET | Refills: 0 | Status: SHIPPED | OUTPATIENT
Start: 2018-01-09 | End: 2018-01-09 | Stop reason: SDUPTHER

## 2018-01-09 NOTE — PROGRESS NOTES
CHIEF COMPLAINT: Back Pain    HPI  Jono Garza is a 42 y.o. male.  He is here to follow up for Back Pain    Jono Garza is a 42 y.o. male  who presents to the office for follow-up.   Since last visit their pain has remain unchanged. Stable on medication. 50-60% pain relief. Had follow up with cardiologist, stress test and echo which were WNL. Diagnosed with SVT.   The patient states their pain is a 6 on a scale of 1-10.  The patient describes this pain as constant dull, ache and throbbing.  The pain is located in bilateral mid back and does radiate into bilateral LE to knees. This painful problem is aggravated by cold weather, physical activity and is alleviated by rest, pain medication.    Past pain medications: Hydrocodone 5/325 and 7.5/325 QID - not very effective  Hydrocodone 10/325 mg qid - effective - decrease pain level down to a 2-3. Able to be more active, perform more daily activities.   Mobic 15 mg daily - helping      Current pain medications:   hydro/apap 7.5-325 mg TID  diclofenac-misoprostol 50-0.2 mg bid prn - does not take every day.   Gabapentin 600 mg BID - helping  Elavil 50 mg qhs - helping him sleep      Past therapies:  Physical Therapy: yes - last visit was over 1 year ago.   Chiropractor: yes  Massage Therapy: no  TENS: yes - used at PT and chiropractor  Neck or back surgery: no      Previous Injection: Lumbar Epidural Steroid Injection at  L5/S1 on 8/23/17  Effect of Injection (%): 15%  Length of Relief: 3-5 days       Previous Injections: yes, in his lower back at [facility]- [facility] x 3 - minimal benefit.   Effect of Injection (%): 0%, he said if it had any affect it did not last long    PEG Assessment   What number best describes your pain on average in the past week? 6  What number best describes how, during the past week, pain has interfered with your enjoyment of life? 6  What number best describes how, during the past week, pain has interfered with  your general activity? 6      Current Outpatient Prescriptions:   •  albuterol (PROVENTIL HFA;VENTOLIN HFA) 108 (90 Base) MCG/ACT inhaler, Inhale 2 puffs Every 4 (Four) Hours As Needed for Wheezing., Disp: , Rfl:   •  alendronate (FOSAMAX) 5 MG tablet, Take 5 mg by mouth Every Morning Before Breakfast., Disp: , Rfl:   •  amitriptyline (ELAVIL) 50 MG tablet, Take 50 mg by mouth Every Night., Disp: , Rfl:   •  bisoprolol-hydrochlorothiazide (ZIAC) 5-6.25 MG per tablet, Take 1 tablet by mouth Daily., Disp: 90 tablet, Rfl: 3  •  diclofenac-misoprostol (ARTHROTEC 50) 50-0.2 MG EC tablet, TAKE ONE TABLET BY MOUTH TWICE A DAY, Disp: 60 tablet, Rfl: 5  •  Ergocalciferol (VITAMIN D2 PO), Take 125 mg by mouth 1 (One) Time Per Week., Disp: , Rfl:   •  fluconazole (DIFLUCAN) 100 MG tablet, Take 50 mg by mouth 2 (Two) Times a Day., Disp: , Rfl:   •  Fluticasone Furoate-Vilanterol (BREO ELLIPTA) 100-25 MCG/INH aerosol powder , Inhale 1 puff Daily., Disp: , Rfl:   •  gabapentin (NEURONTIN) 600 MG tablet, Take 1 tablet by mouth 3 (Three) Times a Day., Disp: 90 tablet, Rfl: 5  •  HYDROcodone-acetaminophen (NORCO) 7.5-325 MG per tablet, Take 1 tablet by mouth 3 (Three) Times a Day As Needed (pain)., Disp: 90 tablet, Rfl: 0  •  Loratadine 10 MG capsule, Take 10 mg by mouth Daily., Disp: , Rfl:   •  meloxicam (MOBIC) 15 MG tablet, Take 15 mg by mouth Daily., Disp: , Rfl:   •  pramipexole (MIRAPEX) 0.125 MG tablet, Take 0.125 mg by mouth 3 (Three) Times a Day., Disp: , Rfl:   •  Umeclidinium Bromide (INCRUSE ELLIPTA) 62.5 MCG/INH aerosol powder , Inhale 1 puff Daily., Disp: , Rfl:     IMAGING  MRI femur 7/27/16  IMPRESSION: Normal MRI of the thigh and femur. Incidental benign lesion noted in the distal femoral shaft as detailed above.       MRI lumbar spine 3/15/16 - in [place] - no results posted - patient states he had it performed, will try to obtain.  Summary from the [facility] note the lumbar MRI showed:  1. Old  superior L1 vertebral body compression fracture no associated retropulsion of vertebral body  2. Mild T12-L1 disc degeneration.  3. Mild L5-S1 disc degeneration producing mild right-sided stenosis      Lumbar Mri 12/2014:  IMPRESSION-  1. There is some marrow edema in the right sacral ala at site of  previously diagnosed fracture. This is probably related to fracture  healing, but please correlate with the pelvic MRI and please be aware  that CT scanning is more sensitive than MRI for assessment of the  fracture line itself.  2. At the L5-S1 level, there is a rightward disk extrusion with some  mild mass effect on the right lateral recess expected location of the  right S1 root, but there is no central canal stenosis. Please refer to  the multiple level description and level-by-level discussion above.  3. Chronic anterior wedging at T12 and L1. No acute compression  Fracture.      Lumbar x-rays with flexion extension views 3/31/2016:  1. Stable chronic mild superior L1 vertebral body compression deformity, no acute fracture   2. New amount posterior L5 disc narrowing likely due to early degenerative change  3. Diffuse osteopenia greater than expected for age  4. Decreased range of motion      EMG on 3/31/2016:  1. Normal study    Imaging last reviewed: 01/09/18     PFSH:  The following portions of the patient's history were reviewed and updated as appropriate: problem list, past medical history, past surgery history, social history, family history, medications, and allergies    Review of Systems   Constitutional: Negative for chills and fever.   Respiratory: Positive for shortness of breath.    Cardiovascular: Negative for chest pain.   Gastrointestinal: Negative for constipation, diarrhea, nausea and vomiting.   Genitourinary: Negative for difficulty urinating, dysuria and enuresis.   Musculoskeletal: Positive for back pain.   Neurological: Positive for numbness. Negative for dizziness, weakness, light-headedness  "and headaches.   Psychiatric/Behavioral: Positive for sleep disturbance. Negative for confusion, hallucinations, self-injury and suicidal ideas. The patient is nervous/anxious.    All other systems reviewed and are negative.      Vitals:    01/09/18 1145   BP: 137/84   Pulse: 82   Resp: 18   Temp: 98.1 °F (36.7 °C)   SpO2: 97%   Weight: 107 kg (236 lb)   Height: 175.3 cm (69\")   PainSc:   6   PainLoc: Back       Physical Exam   Constitutional: He is oriented to person, place, and time. He appears well-developed and well-nourished. No distress.   HENT:   Head: Normocephalic and atraumatic.   Nose: Nose normal.   Mouth/Throat: Oropharynx is clear and moist.   Eyes: Conjunctivae and EOM are normal.   Neck: Normal range of motion. Neck supple.   Pulmonary/Chest: Effort normal. No stridor. No respiratory distress.   Musculoskeletal:        Right hip: He exhibits tenderness.        Left hip: He exhibits tenderness.        Lumbar back: He exhibits tenderness and pain.   +bilateral lumbar facet tenderness  +bilateral lumbar facet loading   +TTP over sacral and pelvic bones bilaterally      Neurological: He is alert and oriented to person, place, and time. No cranial nerve deficit or sensory deficit.   Skin: Skin is warm and dry. No rash noted. He is not diaphoretic.   Psychiatric: He has a normal mood and affect. His speech is normal and behavior is normal.   Nursing note and vitals reviewed.    Ortho Exam  Neurologic Exam     Mental Status   Oriented to person, place, and time.   Speech: speech is normal     Cranial Nerves     CN III, IV, VI   Extraocular motions are normal.       Lab Results   Component Value Date    POCMETH Negative 11/13/2017    POCAMPHET Negative 11/13/2017    POCBARBITUR Negative 11/13/2017    POCBENZO Negative 11/13/2017    POCCOCAINE Negative 11/13/2017    POCMETHADO Negative 11/13/2017    POCOPIATES Positive (A) 11/13/2017    POCOXYCODO Negative 11/13/2017    POCPHENCYC Negative 11/13/2017    " POCPROPOXY Negative 11/13/2017    POCTHC Negative 11/13/2017    POCTRICYC Positive (A) 11/13/2017     Last UDS results reviewed: 01/09/18   Last UDS: 7/18/17  Comments: Consistent       Date of last LUISA reviewed : 01/09/18   Comments: Consistent     Assessment/Plan   Jono was seen today for back pain.    Diagnoses and all orders for this visit:    Chronic bilateral low back pain with left-sided sciatica    DDD (degenerative disc disease), lumbar  -     Discontinue: HYDROcodone-acetaminophen (NORCO) 7.5-325 MG per tablet; Take 1 tablet by mouth 3 (Three) Times a Day As Needed (pain).  -     HYDROcodone-acetaminophen (NORCO) 7.5-325 MG per tablet; Take 1 tablet by mouth 3 (Three) Times a Day As Needed (pain).    Lumbar spondylolysis    Chronic bilateral low back pain with bilateral sciatica  -     Discontinue: HYDROcodone-acetaminophen (NORCO) 7.5-325 MG per tablet; Take 1 tablet by mouth 3 (Three) Times a Day As Needed (pain).  -     HYDROcodone-acetaminophen (NORCO) 7.5-325 MG per tablet; Take 1 tablet by mouth 3 (Three) Times a Day As Needed (pain).      Requested Prescriptions     Signed Prescriptions Disp Refills   • HYDROcodone-acetaminophen (NORCO) 7.5-325 MG per tablet 90 tablet 0     Sig: Take 1 tablet by mouth 3 (Three) Times a Day As Needed (pain).     - Patient appears stable with current regimen. No adverse effects. Regarding continuation of opioids, there is no evidence of aberrant behavior or any red flags. The patient continues with appropriate response to opioid therapy. ADL's remain intact by self.    - CONTINUE norco 7.5/325 mg tid prn pain. (#90, 1 refills)   - Two months prescriptions given today. Appropriate DNF dates applied.   - Last UDS performed was reviewed and consistent. Random urine drug screen per office policy today, to be checked at next visit.   - Minimal benefit with LESI, can repeat in future to see if he gets any more benefit. Will hold off for now.   - Will take over  gabapentin medication. Must be in UDS from now on. Currently taking bid. Will increase tid. Continue gabapentin 600 mg tid - helping, No refills needed today.       - Continue diclofenac-misoprostol 50-0.2 mg. No refills needed today.   - Continue home exercise program.   -  Given his severe pain that is not responsive to conservative therapy, patient would likely benefit from neuromodulation. He does not want to trial at this time.   - importance of wearing CPAP, especially while on opioid medication.     Wt Readings from Last 3 Encounters:   01/09/18 107 kg (236 lb)   12/28/17 105 kg (232 lb)   12/26/17 103 kg (227 lb 1.2 oz)     Body mass index is 34.85 kg/(m^2). Patient counseled on the importance of weight loss to help with overall health and pain control. Patient instructed to attempt weight loss.   Plan: Calorie counting  reduce portion size, cut out extra servings, reduce fast food intake and plan meals    Follow-up in 2 months.    Chante Duarte MD  Pain Management    LUISA REPORT  As part of the patient's treatment plan, I am prescribing controlled substances. The patient has been made aware of appropriate use of such medications, including potential risk of somnolence, limited ability to drive and/or work safely, and the potential for dependence or overdose. It has also bee made clear that these medications are for use by this patient only, without concomitant use of alcohol or other substances unless prescribed.   Patient has completed prescribing agreement detailing terms of continued prescribing of controlled substances, including monitoring LUISA reports, urine drug screening, and pill counts if necessary. The patient is aware that inappropriate use will results in cessation of prescribing such medications.  LUISA report has been reviewed and scanned into the patient's chart.  History and physical exam exhibit continued safe and appropriate use of controlled substances.

## 2018-03-01 DIAGNOSIS — G89.29 CHRONIC BILATERAL LOW BACK PAIN WITH LEFT-SIDED SCIATICA: ICD-10-CM

## 2018-03-01 DIAGNOSIS — K31.89 STOMACH IRRITATION: ICD-10-CM

## 2018-03-01 DIAGNOSIS — M54.42 CHRONIC BILATERAL LOW BACK PAIN WITH LEFT-SIDED SCIATICA: ICD-10-CM

## 2018-03-01 DIAGNOSIS — M51.36 DDD (DEGENERATIVE DISC DISEASE), LUMBAR: ICD-10-CM

## 2018-03-01 RX ORDER — DICLOFENAC SODIUM AND MISOPROSTOL 50; 200 MG/1; UG/1
TABLET, DELAYED RELEASE ORAL
Qty: 60 TABLET | Refills: 4 | Status: SHIPPED | OUTPATIENT
Start: 2018-03-01 | End: 2018-09-02 | Stop reason: SDUPTHER

## 2018-03-06 ENCOUNTER — OFFICE VISIT (OUTPATIENT)
Dept: PAIN MEDICINE | Facility: CLINIC | Age: 43
End: 2018-03-06

## 2018-03-06 VITALS
OXYGEN SATURATION: 95 % | RESPIRATION RATE: 18 BRPM | BODY MASS INDEX: 39.19 KG/M2 | SYSTOLIC BLOOD PRESSURE: 134 MMHG | TEMPERATURE: 98.2 F | HEIGHT: 69 IN | HEART RATE: 81 BPM | WEIGHT: 264.6 LBS | DIASTOLIC BLOOD PRESSURE: 72 MMHG

## 2018-03-06 DIAGNOSIS — G89.29 CHRONIC BILATERAL LOW BACK PAIN WITH BILATERAL SCIATICA: ICD-10-CM

## 2018-03-06 DIAGNOSIS — M54.41 CHRONIC BILATERAL LOW BACK PAIN WITH BILATERAL SCIATICA: ICD-10-CM

## 2018-03-06 DIAGNOSIS — M43.06 LUMBAR SPONDYLOLYSIS: ICD-10-CM

## 2018-03-06 DIAGNOSIS — M54.42 CHRONIC BILATERAL LOW BACK PAIN WITH BILATERAL SCIATICA: ICD-10-CM

## 2018-03-06 DIAGNOSIS — M54.42 CHRONIC BILATERAL LOW BACK PAIN WITH LEFT-SIDED SCIATICA: Primary | ICD-10-CM

## 2018-03-06 DIAGNOSIS — M51.36 DDD (DEGENERATIVE DISC DISEASE), LUMBAR: ICD-10-CM

## 2018-03-06 DIAGNOSIS — G89.29 CHRONIC BILATERAL LOW BACK PAIN WITH LEFT-SIDED SCIATICA: Primary | ICD-10-CM

## 2018-03-06 PROCEDURE — 99214 OFFICE O/P EST MOD 30 MIN: CPT | Performed by: PAIN MEDICINE

## 2018-03-06 RX ORDER — HYDROCODONE BITARTRATE AND ACETAMINOPHEN 7.5; 325 MG/1; MG/1
1 TABLET ORAL 4 TIMES DAILY
Qty: 120 TABLET | Refills: 0 | Status: SHIPPED | OUTPATIENT
Start: 2018-03-06 | End: 2018-03-06 | Stop reason: SDUPTHER

## 2018-03-06 RX ORDER — HYDROCODONE BITARTRATE AND ACETAMINOPHEN 7.5; 325 MG/1; MG/1
1 TABLET ORAL 3 TIMES DAILY
Qty: 90 TABLET | Refills: 0 | Status: SHIPPED | OUTPATIENT
Start: 2018-03-06 | End: 2018-05-04 | Stop reason: SDUPTHER

## 2018-03-06 NOTE — PROGRESS NOTES
CHIEF COMPLAINT: Back Pain    HPI  Jono Garza is a 42 y.o. male.  He is here to follow up for Back Pain    Jono Garza is a 42 y.o. male  who presents to the office for follow-up. Since last visit their pain has worsened. More trouble putting shoes.   The patient states their pain is a 6 on a scale of 1-10.  The patient describes this pain as constant dull, ache and throbbing.  The pain is located in bilateral low back and does radiate into posterior thigh of LLE stopping before knee. This painful problem is aggravated by cold weather, physical activity and is alleviated by rest, pain medication. Numbness in BLE is improved with back brace.     Since last visit has been to ER for left sided abdominal wall. [facility], chest and abdominal CT. Found right sided adrenal mass. MRI denied due to insurance reasons. Has abdominal US which found liver lesions.     Has disability hearing coming up this month.     Past pain medications: Hydrocodone 5/325 and 7.5/325 QID - not very effective  Hydrocodone 10/325 mg qid - effective - decrease pain level down to a 2-3. Able to be more active, perform more daily activities.   Mobic 15 mg daily - helping      Current pain medications:   hydro/apap 7.5-325 mg TID  diclofenac-misoprostol 50-0.2 mg bid prn - does not take every day.   Gabapentin 600 mg BID - helping  Elavil 50 mg qhs - helping him sleep      Past therapies:  Physical Therapy: yes - last visit was over 1 year ago.   Chiropractor: yes  Massage Therapy: no  TENS: yes - used at PT and chiropractor  Neck or back surgery: no      Previous Injection: Lumbar Epidural Steroid Injection at  L5/S1 on 8/23/17  Effect of Injection (%): 15%  Length of Relief: 3-5 days       Previous Injections: yes, in his lower back at [facility]- LESI x 3 - minimal benefit.   Effect of Injection (%): 0%, he said if it had any affect it did not last long    PEG Assessment   What number best describes your  pain on average in the past week? 6  What number best describes how, during the past week, pain has interfered with your enjoyment of life? 7  What number best describes how, during the past week, pain has interfered with your general activity? 7      Current Outpatient Prescriptions:   •  albuterol (PROVENTIL HFA;VENTOLIN HFA) 108 (90 Base) MCG/ACT inhaler, Inhale 2 puffs Every 4 (Four) Hours As Needed for Wheezing., Disp: , Rfl:   •  alendronate (FOSAMAX) 5 MG tablet, Take 5 mg by mouth Every Morning Before Breakfast., Disp: , Rfl:   •  amitriptyline (ELAVIL) 50 MG tablet, Take 50 mg by mouth Every Night., Disp: , Rfl:   •  bisoprolol-hydrochlorothiazide (ZIAC) 5-6.25 MG per tablet, Take 1 tablet by mouth Daily., Disp: 90 tablet, Rfl: 3  •  diclofenac-misoprostol (ARTHROTEC 50) 50-0.2 MG EC tablet, TAKE ONE TABLET BY MOUTH TWICE A DAY, Disp: 60 tablet, Rfl: 4  •  Ergocalciferol (VITAMIN D2 PO), Take 125 mg by mouth 1 (One) Time Per Week., Disp: , Rfl:   •  fluconazole (DIFLUCAN) 100 MG tablet, Take 50 mg by mouth 2 (Two) Times a Day., Disp: , Rfl:   •  Fluticasone Furoate-Vilanterol (BREO ELLIPTA) 100-25 MCG/INH aerosol powder , Inhale 1 puff Daily., Disp: , Rfl:   •  gabapentin (NEURONTIN) 600 MG tablet, Take 1 tablet by mouth 3 (Three) Times a Day., Disp: 90 tablet, Rfl: 5  •  HYDROcodone-acetaminophen (NORCO) 7.5-325 MG per tablet, Take 1 tablet by mouth 3 (Three) Times a Day., Disp: 90 tablet, Rfl: 0  •  Loratadine 10 MG capsule, Take 10 mg by mouth Daily., Disp: , Rfl:   •  pramipexole (MIRAPEX) 0.125 MG tablet, Take 0.125 mg by mouth 3 (Three) Times a Day., Disp: , Rfl:   •  Umeclidinium Bromide (INCRUSE ELLIPTA) 62.5 MCG/INH aerosol powder , Inhale 1 puff Daily., Disp: , Rfl:     IMAGING  MRI femur 7/27/16  IMPRESSION: Normal MRI of the thigh and femur. Incidental benign lesion noted in the distal femoral shaft as detailed above.       MRI lumbar spine 3/15/16 - in [place] - no results posted - patient  states he had it performed, will try to obtain.  Summary from the [facility] note the lumbar MRI showed:  1. Old superior L1 vertebral body compression fracture no associated retropulsion of vertebral body  2. Mild T12-L1 disc degeneration.  3. Mild L5-S1 disc degeneration producing mild right-sided stenosis      Lumbar Mri 12/2014:  IMPRESSION-  1. There is some marrow edema in the right sacral ala at site of  previously diagnosed fracture. This is probably related to fracture  healing, but please correlate with the pelvic MRI and please be aware  that CT scanning is more sensitive than MRI for assessment of the  fracture line itself.  2. At the L5-S1 level, there is a rightward disk extrusion with some  mild mass effect on the right lateral recess expected location of the  right S1 root, but there is no central canal stenosis. Please refer to  the multiple level description and level-by-level discussion above.  3. Chronic anterior wedging at T12 and L1. No acute compression  Fracture.      Lumbar x-rays with flexion extension views 3/31/2016:  1. Stable chronic mild superior L1 vertebral body compression deformity, no acute fracture   2. New amount posterior L5 disc narrowing likely due to early degenerative change  3. Diffuse osteopenia greater than expected for age  4. Decreased range of motion      EMG on 3/31/2016:  1. Normal study    Imaging last reviewed: 03/06/18     PFSH:  The following portions of the patient's history were reviewed and updated as appropriate: problem list, past medical history, past surgery history, social history, family history, medications, and allergies    Review of Systems   Constitutional: Negative for fatigue.   HENT: Positive for congestion.    Eyes: Negative for visual disturbance.   Respiratory: Positive for cough, shortness of breath and wheezing.    Cardiovascular: Positive for leg swelling (left leg). Negative for chest pain and palpitations.   Gastrointestinal:  "Negative for constipation and diarrhea.   Genitourinary: Negative for difficulty urinating.   Musculoskeletal: Positive for back pain.   Skin: Negative for wound.   Allergic/Immunologic: Negative for immunocompromised state.   Neurological: Positive for numbness (left leg). Negative for weakness.   Hematological: Does not bruise/bleed easily.   Psychiatric/Behavioral: Positive for sleep disturbance. Negative for suicidal ideas. The patient is nervous/anxious.    All other systems reviewed and are negative.      Vitals:    03/06/18 1103   BP: 134/72   Pulse: 81   Resp: 18   Temp: 98.2 °F (36.8 °C)   SpO2: 95%   Weight: 120 kg (264 lb 9.6 oz)   Height: 175.3 cm (69\")   PainSc:   5   PainLoc: Back       Physical Exam   Constitutional: He is oriented to person, place, and time. He appears well-developed and well-nourished. No distress.   HENT:   Head: Normocephalic and atraumatic.   Nose: Nose normal.   Mouth/Throat: Oropharynx is clear and moist.   Eyes: Conjunctivae and EOM are normal.   Neck: Normal range of motion. Neck supple.   Pulmonary/Chest: Effort normal. No stridor. No respiratory distress.   Musculoskeletal:        Right hip: He exhibits tenderness.        Left hip: He exhibits tenderness.        Lumbar back: He exhibits tenderness and pain.   +bilateral lumbar facet tenderness  +bilateral lumbar facet loading   +TTP over sacral and pelvic bones bilaterally      Neurological: He is alert and oriented to person, place, and time. No cranial nerve deficit or sensory deficit.   Skin: Skin is warm and dry. No rash noted. He is not diaphoretic.   Psychiatric: He has a normal mood and affect. His speech is normal and behavior is normal.   Nursing note and vitals reviewed.    Ortho Exam  Neurologic Exam     Mental Status   Oriented to person, place, and time.   Speech: speech is normal     Cranial Nerves     CN III, IV, VI   Extraocular motions are normal.       Lab Results   Component Value Date    POCMETH Negative " 11/13/2017    POCAMPHET Negative 11/13/2017    POCBARBITUR Negative 11/13/2017    POCBENZO Negative 11/13/2017    POCCOCAINE Negative 11/13/2017    POCMETHADO Negative 11/13/2017    POCOPIATES Positive (A) 11/13/2017    POCOXYCODO Negative 11/13/2017    POCPHENCYC Negative 11/13/2017    POCPROPOXY Negative 11/13/2017    POCTHC Negative 11/13/2017    POCTRICYC Positive (A) 11/13/2017     Last UDS results reviewed: 03/06/18   Last UDS: 1/2018  Comments: Consistent - gabapentin not tested      Date of last LUISA reviewed : 03/06/18   Comments: Consistent     Assessment/Plan   Jono was seen today for back pain.    Diagnoses and all orders for this visit:    Chronic bilateral low back pain with left-sided sciatica    DDD (degenerative disc disease), lumbar  -     Discontinue: HYDROcodone-acetaminophen (NORCO) 7.5-325 MG per tablet; Take 1 tablet by mouth 4 (Four) Times a Day.  -     Discontinue: HYDROcodone-acetaminophen (NORCO) 7.5-325 MG per tablet; Take 1 tablet by mouth 4 (Four) Times a Day.  -     HYDROcodone-acetaminophen (NORCO) 7.5-325 MG per tablet; Take 1 tablet by mouth 3 (Three) Times a Day.    Lumbar spondylolysis    Chronic bilateral low back pain with bilateral sciatica  -     Discontinue: HYDROcodone-acetaminophen (NORCO) 7.5-325 MG per tablet; Take 1 tablet by mouth 4 (Four) Times a Day.  -     Discontinue: HYDROcodone-acetaminophen (NORCO) 7.5-325 MG per tablet; Take 1 tablet by mouth 4 (Four) Times a Day.  -     HYDROcodone-acetaminophen (NORCO) 7.5-325 MG per tablet; Take 1 tablet by mouth 3 (Three) Times a Day.      Requested Prescriptions     Signed Prescriptions Disp Refills   • HYDROcodone-acetaminophen (NORCO) 7.5-325 MG per tablet 90 tablet 0     Sig: Take 1 tablet by mouth 3 (Three) Times a Day.     - currently flare up of low back. Will given it a month or two to see if it will settle down. Given no new radicular symptoms will hold off on imaging for now. Imaging reviewed with patient.     - will increase pain medication for one month only then return back to his baseline medication as he feels better. Filled medication on 2/16/2018 - been taking tid - has 51 tablets left- start today at qid - will last 10 mor days. Will write DNF date appropriately.  Increase to qid x 1 month, then back to tid after that.   - Two months prescriptions given today. Appropriate DNF dates applied.    - Patient appears stable with current regimen. No adverse effects. Regarding continuation of opioids, there is no evidence of aberrant behavior or any red flags. The patient continues with appropriate response to opioid therapy. ADL's remain intact by self.  - Two months prescriptions given today. Appropriate DNF dates applied.   - Last UDS performed was reviewed and consistent. Patient is of mild risk, has history of dismissal for past pain clinic.   - Minimal benefit with LESI, can repeat in future to see if he gets any more benefit. Will hold off for now.   - Will take over gabapentin medication. Must be in UDS from now on. Not tested in last UDS. Continue gabapentin 600 mg tid - helping, No refills needed today.       - Continue diclofenac-misoprostol 50-0.2 mg. No refills needed today.   - Continue home exercise program.   -  Given his severe pain that is not responsive to conservative therapy, patient would likely benefit from neuromodulation. He does not want to trial at this time.   - importance of wearing CPAP, especially while on opioid medication.   - This was a 25 minute face to face visit with 15 minutes spent counseling on medication, usage and treatment plan.      Wt Readings from Last 3 Encounters:   03/06/18 120 kg (264 lb 9.6 oz)   01/09/18 107 kg (236 lb)   12/28/17 105 kg (232 lb)     Body mass index is 39.07 kg/(m^2). Patient counseled on the importance of weight loss to help with overall health and pain control. Patient instructed to attempt weight loss.   Plan: Calorie counting  reduce portion size,  cut out extra servings and reduce fast food intake    Follow-up in 2 months.    Chante Duarte MD  Pain Management    LUISA REPORT  As part of the patient's treatment plan, I am prescribing controlled substances. The patient has been made aware of appropriate use of such medications, including potential risk of somnolence, limited ability to drive and/or work safely, and the potential for dependence or overdose. It has also bee made clear that these medications are for use by this patient only, without concomitant use of alcohol or other substances unless prescribed.   Patient has completed prescribing agreement detailing terms of continued prescribing of controlled substances, including monitoring LUISA reports, urine drug screening, and pill counts if necessary. The patient is aware that inappropriate use will results in cessation of prescribing such medications.  LUISA report has been reviewed and scanned into the patient's chart.  History and physical exam exhibit continued safe and appropriate use of controlled substances.

## 2018-04-17 ENCOUNTER — APPOINTMENT (OUTPATIENT)
Dept: LAB | Facility: HOSPITAL | Age: 43
End: 2018-04-17

## 2018-04-17 ENCOUNTER — CONSULT (OUTPATIENT)
Dept: ONCOLOGY | Facility: CLINIC | Age: 43
End: 2018-04-17

## 2018-04-17 VITALS
DIASTOLIC BLOOD PRESSURE: 87 MMHG | TEMPERATURE: 98.1 F | HEIGHT: 69 IN | BODY MASS INDEX: 36.51 KG/M2 | WEIGHT: 246.5 LBS | OXYGEN SATURATION: 93 % | RESPIRATION RATE: 16 BRPM | SYSTOLIC BLOOD PRESSURE: 135 MMHG | HEART RATE: 74 BPM

## 2018-04-17 DIAGNOSIS — D72.828 OTHER ELEVATED WHITE BLOOD CELL (WBC) COUNT: Primary | ICD-10-CM

## 2018-04-17 DIAGNOSIS — D72.19 EOSINOPHILIC LEUKOCYTOSIS: Primary | ICD-10-CM

## 2018-04-17 LAB
BASOPHILS # BLD AUTO: 0.09 10*3/MM3 (ref 0–0.2)
BASOPHILS NFR BLD AUTO: 0.9 % (ref 0–2)
DEPRECATED RDW RBC AUTO: 43.8 FL (ref 37–54)
EOSINOPHIL # BLD AUTO: 0.46 10*3/MM3 (ref 0.1–0.3)
EOSINOPHIL NFR BLD AUTO: 4.7 % (ref 0–4)
ERYTHROCYTE [DISTWIDTH] IN BLOOD BY AUTOMATED COUNT: 12.5 % (ref 11.5–14.5)
HCT VFR BLD AUTO: 41.1 % (ref 42–52)
HGB BLD-MCNC: 14.5 G/DL (ref 14–18)
IMM GRANULOCYTES # BLD: 0.06 10*3/MM3 (ref 0–0.03)
IMM GRANULOCYTES NFR BLD: 0.6 % (ref 0–0.5)
LYMPHOCYTES # BLD AUTO: 3.74 10*3/MM3 (ref 0.6–4.8)
LYMPHOCYTES NFR BLD AUTO: 38.1 % (ref 20–45)
MCH RBC QN AUTO: 34 PG (ref 27–31)
MCHC RBC AUTO-ENTMCNC: 35.3 G/DL (ref 31–37)
MCV RBC AUTO: 96.5 FL (ref 80–94)
MONOCYTES # BLD AUTO: 0.46 10*3/MM3 (ref 0–1)
MONOCYTES NFR BLD AUTO: 4.7 % (ref 3–8)
NEUTROPHILS # BLD AUTO: 5 10*3/MM3 (ref 1.5–8.3)
NEUTROPHILS NFR BLD AUTO: 51 % (ref 45–70)
NRBC BLD MANUAL-RTO: 0 /100 WBC (ref 0–0)
PLATELET # BLD AUTO: 394 10*3/MM3 (ref 140–500)
PMV BLD AUTO: 9.7 FL (ref 7.4–10.4)
RBC # BLD AUTO: 4.26 10*6/MM3 (ref 4.7–6.1)
WBC NRBC COR # BLD: 9.81 10*3/MM3 (ref 4.8–10.8)

## 2018-04-17 PROCEDURE — 99243 OFF/OP CNSLTJ NEW/EST LOW 30: CPT | Performed by: INTERNAL MEDICINE

## 2018-04-17 PROCEDURE — 36415 COLL VENOUS BLD VENIPUNCTURE: CPT | Performed by: INTERNAL MEDICINE

## 2018-04-17 PROCEDURE — 85025 COMPLETE CBC W/AUTO DIFF WBC: CPT

## 2018-04-17 RX ORDER — MELOXICAM 15 MG/1
15 TABLET ORAL DAILY
COMMUNITY
End: 2018-05-04 | Stop reason: SDUPTHER
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
## 2018-04-24 ENCOUNTER — HOSPITAL ENCOUNTER (OUTPATIENT)
Dept: CT IMAGING | Facility: HOSPITAL | Age: 43
Discharge: HOME | End: 2018-04-24
Attending: INTERNAL MEDICINE | Admitting: INTERNAL MEDICINE

## 2018-04-24 DIAGNOSIS — R91.1 LUNG NODULE: ICD-10-CM

## 2018-04-24 PROCEDURE — 71250 CT THORAX DX C-: CPT

## 2018-05-04 ENCOUNTER — OFFICE VISIT (OUTPATIENT)
Dept: PAIN MEDICINE | Facility: CLINIC | Age: 43
End: 2018-05-04

## 2018-05-04 VITALS
HEIGHT: 69 IN | SYSTOLIC BLOOD PRESSURE: 124 MMHG | HEART RATE: 85 BPM | RESPIRATION RATE: 18 BRPM | DIASTOLIC BLOOD PRESSURE: 83 MMHG | TEMPERATURE: 97.2 F | OXYGEN SATURATION: 95 % | BODY MASS INDEX: 37.26 KG/M2 | WEIGHT: 251.6 LBS

## 2018-05-04 DIAGNOSIS — G89.29 CHRONIC BILATERAL LOW BACK PAIN WITH LEFT-SIDED SCIATICA: ICD-10-CM

## 2018-05-04 DIAGNOSIS — Z79.891 ENCOUNTER FOR MONITORING OPIOID MAINTENANCE THERAPY: ICD-10-CM

## 2018-05-04 DIAGNOSIS — M54.42 CHRONIC BILATERAL LOW BACK PAIN WITH LEFT-SIDED SCIATICA: ICD-10-CM

## 2018-05-04 DIAGNOSIS — G89.29 CHRONIC BILATERAL LOW BACK PAIN WITH BILATERAL SCIATICA: ICD-10-CM

## 2018-05-04 DIAGNOSIS — M51.36 DDD (DEGENERATIVE DISC DISEASE), LUMBAR: ICD-10-CM

## 2018-05-04 DIAGNOSIS — M54.41 CHRONIC BILATERAL LOW BACK PAIN WITH BILATERAL SCIATICA: ICD-10-CM

## 2018-05-04 DIAGNOSIS — Z51.81 ENCOUNTER FOR MONITORING OPIOID MAINTENANCE THERAPY: ICD-10-CM

## 2018-05-04 DIAGNOSIS — G89.29 OTHER CHRONIC PAIN: Primary | ICD-10-CM

## 2018-05-04 DIAGNOSIS — M54.42 CHRONIC BILATERAL LOW BACK PAIN WITH BILATERAL SCIATICA: ICD-10-CM

## 2018-05-04 PROCEDURE — 99214 OFFICE O/P EST MOD 30 MIN: CPT | Performed by: NURSE PRACTITIONER

## 2018-05-04 RX ORDER — HYDROCODONE BITARTRATE AND ACETAMINOPHEN 7.5; 325 MG/1; MG/1
1 TABLET ORAL 3 TIMES DAILY
Qty: 90 TABLET | Refills: 0 | Status: SHIPPED | OUTPATIENT
Start: 2018-05-04 | End: 2018-05-04 | Stop reason: SDUPTHER

## 2018-05-04 RX ORDER — HYDROCODONE BITARTRATE AND ACETAMINOPHEN 7.5; 325 MG/1; MG/1
1 TABLET ORAL 3 TIMES DAILY
Qty: 90 TABLET | Refills: 0 | Status: SHIPPED | OUTPATIENT
Start: 2018-05-04 | End: 2018-06-08 | Stop reason: SDUPTHER

## 2018-05-04 NOTE — PROGRESS NOTES
"CHIEF COMPLAINT  Follow-up for back pain. Mr. Garza states that his back pain has increased since his last appt.    Subjective   Jono Garza is a 42 y.o. male  who presents to the office for follow-up.He has a history of chronic back pain.    C/o low back pain. Was having acute flare up last visit and given 4 pain pills/day which he reports helped and \"worked so much better\".  Today he reprots that his pain is 5-6/10 in severity.  He continues with use of hydrocodone 7.5/325 3/day, Gabapentin 600 mg 3/day, Meloxicam 15 AND Diclofenac.  Reports moderate reduction in pain with current regimen, denies adverse reactions.      Left adrenal tumor.  Awaiting biopsy, reports PCP Dr. Jaquez has ordered.  He saw hem/onc (Dr. Gonzalez) 4/17/18 because of frequent infection/intermittent leukocytosis, said likely inflammatory and recommended smoking cessation.  Patient reports that he has not had a cigarette since that appointment.      Previous failure with lumbar CARIN's.      Back Pain   This is a chronic problem. The current episode started more than 1 year ago. The problem occurs constantly. The problem has been waxing and waning since onset. The pain is present in the lumbar spine. The quality of the pain is described as aching and burning. The pain radiates to the left thigh. The pain is at a severity of 5/10. Exacerbated by: activity. Associated symptoms include numbness (left leg) and weakness (left leg). Pertinent negatives include no bladder incontinence or bowel incontinence. Risk factors include lack of exercise and obesity. He has tried NSAIDs and analgesics for the symptoms. The treatment provided mild relief.      Past pain medications: Hydrocodone 5/325 and 7.5/325 QID - not very effective  Hydrocodone 10/325 mg qid - effective - decrease pain level down to a 2-3. Able to be more active, perform more daily activities.   Mobic 15 mg daily - helping      Current pain medications:   hydro/apap 7.5-325 mg " TID  diclofenac-misoprostol 50-0.2 mg bid prn - does not take every day.   Gabapentin 600 mg BID - helping  Elavil 50 mg qhs - helping him sleep      Past therapies:  Physical Therapy: yes - last visit was over 1 year ago.   Chiropractor: yes  Massage Therapy: no  TENS: yes - used at PT and chiropractor  Neck or back surgery: no      Previous Injection: Lumbar Epidural Steroid Injection at  L5/S1 on 8/23/17  Effect of Injection (%): 15%  Length of Relief: 3-5 days       Previous Injections: yes, in his lower back at [facility]- [facility] x 3 - minimal benefit.   Effect of Injection (%): 0%, he said if it had any affect it did not last long    PEG Assessment   What number best describes your pain on average in the past week?6  What number best describes how, during the past week, pain has interfered with your enjoyment of life?6  What number best describes how, during the past week, pain has interfered with your general activity?  6      The following portions of the patient's history were reviewed and updated as appropriate: allergies, current medications, past family history, past medical history, past social history, past surgical history and problem list.    Review of Systems   Constitutional: Positive for fatigue.   HENT: Positive for congestion.    Eyes: Negative for visual disturbance.   Respiratory: Positive for cough, shortness of breath and wheezing.    Cardiovascular: Negative.    Gastrointestinal: Negative for bowel incontinence, constipation and diarrhea.   Genitourinary: Negative for bladder incontinence and difficulty urinating.   Musculoskeletal: Positive for back pain.   Neurological: Positive for weakness (left leg) and numbness (left leg).   Psychiatric/Behavioral: Positive for sleep disturbance. Negative for suicidal ideas. The patient is nervous/anxious.        Vitals:    05/04/18 1010   BP: 124/83   Pulse: 85   Resp: 18   Temp: 97.2 °F (36.2 °C)   SpO2: 95%   Weight: 114 kg (251 lb 9.6  "oz)   Height: 175.3 cm (69\")   PainSc:   5   PainLoc: Back       Objective   Physical Exam   Constitutional: He is oriented to person, place, and time. He appears well-developed and well-nourished. No distress.   HENT:   Head: Normocephalic and atraumatic.   Nose: Nose normal.   Mouth/Throat: Oropharynx is clear and moist.   Eyes: Conjunctivae and EOM are normal.   Neck: Normal range of motion. Neck supple.   Pulmonary/Chest: Effort normal. No stridor. No respiratory distress.   Musculoskeletal:        Lumbar back: He exhibits tenderness and pain.         Neurological: He is alert and oriented to person, place, and time. No cranial nerve deficit or sensory deficit. Gait normal.   Reflex Scores:       Patellar reflexes are 2+ on the right side and 2+ on the left side.  Skin: Skin is warm and dry. No rash noted. He is not diaphoretic.   Psychiatric: He has a normal mood and affect. His speech is normal and behavior is normal.   Nursing note and vitals reviewed.    Assessment/Plan   Jono was seen today for back pain.    Diagnoses and all orders for this visit:    Other chronic pain    DDD (degenerative disc disease), lumbar  -     Discontinue: HYDROcodone-acetaminophen (NORCO) 7.5-325 MG per tablet; Take 1 tablet by mouth 3 (Three) Times a Day.  -     HYDROcodone-acetaminophen (NORCO) 7.5-325 MG per tablet; Take 1 tablet by mouth 3 (Three) Times a Day.    Chronic bilateral low back pain with left-sided sciatica    Encounter for monitoring opioid maintenance therapy    Chronic bilateral low back pain with bilateral sciatica  -     Discontinue: HYDROcodone-acetaminophen (NORCO) 7.5-325 MG per tablet; Take 1 tablet by mouth 3 (Three) Times a Day.  -     HYDROcodone-acetaminophen (NORCO) 7.5-325 MG per tablet; Take 1 tablet by mouth 3 (Three) Times a Day.      --- Refill Hydrocodone. Patient appears stable with current regimen. No adverse effects. Regarding continuation of opioids, there is no evidence of aberrant " behavior or any red flags.  The patient continues with appropriate response to opioid therapy. ADL's remain intact by self.   --- patient asked about increasing hydrocodone to 4/day chronically. I emphasized that this was an acute increase only and not part of the long term plan.  If his pain is inadequatly controlled in the future we would need to move toward SCS trial as was previously discussed with Dr. Duarte   --- The urine drug screen confirmation from 1/9/18 has been reviewed and the result is appropriate based on patient history and LUISA report  --- Educated patient that he cannot take Meloxicam and DIclofenac concurrently.  Will discontinue Meloxicam.    --- Follow-up 2 months          LUISA REPORT  As part of the patient's treatment plan, I am prescribing controlled substances. The patient has been made aware of appropriate use of such medications, including potential risk of somnolence, limited ability to drive and/or work safely, and the potential for dependence or overdose. It has also bee made clear that these medications are for use by this patient only, without concomitant use of alcohol or other substances unless prescribed.     Patient has completed prescribing agreement detailing terms of continued prescribing of controlled substances, including monitoring LUISA reports, urine drug screening, and pill counts if necessary. The patient is aware that inappropriate use will results in cessation of prescribing such medications.    LUISA report has been reviewed and scanned into the patient's chart.    Date of last LUISA : 5/3/2018    History and physical exam exhibit continued safe and appropriate use of controlled substances.    EMR Dragon/Transcription disclaimer:   Much of this encounter note is an electronic transcription/translation of spoken language to printed text. The electronic translation of spoken language may permit erroneous, or at times, nonsensical words or phrases to be  inadvertently transcribed; Although I have reviewed the note for such errors, some may still exist.      Initial visit with KEV Carrera

## 2018-05-10 DIAGNOSIS — G89.29 CHRONIC BILATERAL LOW BACK PAIN WITH BILATERAL SCIATICA: ICD-10-CM

## 2018-05-10 DIAGNOSIS — M54.42 CHRONIC BILATERAL LOW BACK PAIN WITH BILATERAL SCIATICA: ICD-10-CM

## 2018-05-10 DIAGNOSIS — M54.41 CHRONIC BILATERAL LOW BACK PAIN WITH BILATERAL SCIATICA: ICD-10-CM

## 2018-05-10 RX ORDER — GABAPENTIN 600 MG/1
TABLET ORAL
Qty: 90 TABLET | Refills: 5 | Status: SHIPPED | OUTPATIENT
Start: 2018-05-10 | End: 2018-11-01 | Stop reason: SDUPTHER

## 2018-06-05 ENCOUNTER — OFFICE VISIT (OUTPATIENT)
Dept: GASTROENTEROLOGY | Facility: CLINIC | Age: 43
End: 2018-06-05

## 2018-06-05 VITALS
SYSTOLIC BLOOD PRESSURE: 128 MMHG | BODY MASS INDEX: 36.4 KG/M2 | HEIGHT: 69 IN | DIASTOLIC BLOOD PRESSURE: 84 MMHG | WEIGHT: 245.8 LBS

## 2018-06-05 DIAGNOSIS — R10.11 RIGHT UPPER QUADRANT ABDOMINAL PAIN: Primary | ICD-10-CM

## 2018-06-05 DIAGNOSIS — K21.9 GASTROESOPHAGEAL REFLUX DISEASE, ESOPHAGITIS PRESENCE NOT SPECIFIED: ICD-10-CM

## 2018-06-05 DIAGNOSIS — R10.12 LEFT UPPER QUADRANT PAIN: ICD-10-CM

## 2018-06-05 PROCEDURE — 99204 OFFICE O/P NEW MOD 45 MIN: CPT | Performed by: INTERNAL MEDICINE

## 2018-06-05 RX ORDER — SUCRALFATE 1 G/1
1 TABLET ORAL 3 TIMES DAILY
Qty: 90 TABLET | Refills: 3 | Status: SHIPPED | OUTPATIENT
Start: 2018-06-05 | End: 2018-10-04 | Stop reason: SDUPTHER

## 2018-06-05 RX ORDER — OMEPRAZOLE 20 MG/1
20 TABLET, DELAYED RELEASE ORAL 2 TIMES DAILY
Qty: 168 TABLET | Refills: 4 | Status: SHIPPED | OUTPATIENT
Start: 2018-06-05 | End: 2018-11-06
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
## 2018-06-06 PROBLEM — R10.12 LEFT UPPER QUADRANT PAIN: Status: ACTIVE | Noted: 2018-06-06

## 2018-06-06 PROBLEM — K21.9 GASTROESOPHAGEAL REFLUX DISEASE: Status: ACTIVE | Noted: 2018-06-06

## 2018-06-08 DIAGNOSIS — M51.36 DDD (DEGENERATIVE DISC DISEASE), LUMBAR: ICD-10-CM

## 2018-06-08 DIAGNOSIS — G89.29 CHRONIC BILATERAL LOW BACK PAIN WITH BILATERAL SCIATICA: ICD-10-CM

## 2018-06-08 DIAGNOSIS — M54.42 CHRONIC BILATERAL LOW BACK PAIN WITH BILATERAL SCIATICA: ICD-10-CM

## 2018-06-08 DIAGNOSIS — M54.41 CHRONIC BILATERAL LOW BACK PAIN WITH BILATERAL SCIATICA: ICD-10-CM

## 2018-06-08 RX ORDER — HYDROCODONE BITARTRATE AND ACETAMINOPHEN 7.5; 325 MG/1; MG/1
1 TABLET ORAL 3 TIMES DAILY
Qty: 90 TABLET | Refills: 0 | Status: SHIPPED | OUTPATIENT
Start: 2018-06-08 | End: 2018-07-13 | Stop reason: SDUPTHER

## 2018-06-08 NOTE — TELEPHONE ENCOUNTER
Medication Refill Request    Date of phone call: 18    Medication being requested: hydrocodone si tab 3 times daily  Qty: 90    Date of last visit: 18    Date of last refill: 5/10/18    LUISA up to date?: yes    Next Follow up?: 7/3/18    Any new pertinent information? (i.e, new medication allergies, new use of medications, change in patient's health or condition, non-compliance or inconsistency with prescribing agreement?):

## 2018-06-26 ENCOUNTER — TELEPHONE (OUTPATIENT)
Dept: SURGERY | Facility: CLINIC | Age: 43
End: 2018-06-26

## 2018-07-05 ENCOUNTER — PRIOR AUTHORIZATION (OUTPATIENT)
Dept: PAIN MEDICINE | Facility: CLINIC | Age: 43
End: 2018-07-05

## 2018-07-05 NOTE — TELEPHONE ENCOUNTER
Received Approval message:      GLENDA MICHELLE (Key: [id number]) - [id number]  Diclofenac-Misoprostol 50-0.2MG OR [facility]  Status: PA Response - Approved    Created: July 5th, 2018    Sent: July 5th, 2018

## 2018-07-05 NOTE — TELEPHONE ENCOUNTER
Sent PA for Diclofenac-Misoprostol to [employer] [facility] [place] [employer] through Cover My Meds (Key # [id number]) and waiting for response

## 2018-07-13 ENCOUNTER — RESULTS ENCOUNTER (OUTPATIENT)
Dept: PAIN MEDICINE | Facility: CLINIC | Age: 43
End: 2018-07-13

## 2018-07-13 ENCOUNTER — OFFICE VISIT (OUTPATIENT)
Dept: PAIN MEDICINE | Facility: CLINIC | Age: 43
End: 2018-07-13

## 2018-07-13 VITALS
HEIGHT: 69 IN | OXYGEN SATURATION: 94 % | DIASTOLIC BLOOD PRESSURE: 80 MMHG | SYSTOLIC BLOOD PRESSURE: 126 MMHG | HEART RATE: 79 BPM | WEIGHT: 246 LBS | TEMPERATURE: 98.2 F | RESPIRATION RATE: 16 BRPM | BODY MASS INDEX: 36.43 KG/M2

## 2018-07-13 DIAGNOSIS — M54.42 CHRONIC BILATERAL LOW BACK PAIN WITH BILATERAL SCIATICA: ICD-10-CM

## 2018-07-13 DIAGNOSIS — M43.06 LUMBAR SPONDYLOLYSIS: ICD-10-CM

## 2018-07-13 DIAGNOSIS — G89.29 CHRONIC BILATERAL LOW BACK PAIN WITH LEFT-SIDED SCIATICA: ICD-10-CM

## 2018-07-13 DIAGNOSIS — G89.29 OTHER CHRONIC PAIN: ICD-10-CM

## 2018-07-13 DIAGNOSIS — M51.36 DDD (DEGENERATIVE DISC DISEASE), LUMBAR: ICD-10-CM

## 2018-07-13 DIAGNOSIS — Z51.81 ENCOUNTER FOR MONITORING OPIOID MAINTENANCE THERAPY: ICD-10-CM

## 2018-07-13 DIAGNOSIS — M54.42 CHRONIC BILATERAL LOW BACK PAIN WITH LEFT-SIDED SCIATICA: ICD-10-CM

## 2018-07-13 DIAGNOSIS — Z79.891 ENCOUNTER FOR MONITORING OPIOID MAINTENANCE THERAPY: ICD-10-CM

## 2018-07-13 DIAGNOSIS — G89.29 OTHER CHRONIC PAIN: Primary | ICD-10-CM

## 2018-07-13 DIAGNOSIS — M54.41 CHRONIC BILATERAL LOW BACK PAIN WITH BILATERAL SCIATICA: ICD-10-CM

## 2018-07-13 DIAGNOSIS — G89.29 CHRONIC BILATERAL LOW BACK PAIN WITH BILATERAL SCIATICA: ICD-10-CM

## 2018-07-13 PROCEDURE — 80305 DRUG TEST PRSMV DIR OPT OBS: CPT | Performed by: NURSE PRACTITIONER

## 2018-07-13 PROCEDURE — 99214 OFFICE O/P EST MOD 30 MIN: CPT | Performed by: NURSE PRACTITIONER

## 2018-07-13 RX ORDER — HYDROCODONE BITARTRATE AND ACETAMINOPHEN 7.5; 325 MG/1; MG/1
1 TABLET ORAL 3 TIMES DAILY
Qty: 90 TABLET | Refills: 0 | Status: SHIPPED | OUTPATIENT
Start: 2018-07-13 | End: 2018-08-07 | Stop reason: SDUPTHER

## 2018-07-13 NOTE — PROGRESS NOTES
"CHIEF COMPLAINT  Pt states LBP bilaterally is moderately increased since last office visit on 5/4/18.    Subjective   Jono Garza is a 42 y.o. male  who presents to the office for follow-up.He has a history of chronic back pain.    C/o low back pain. Today he reprots that his pain is 5-6/10 in severity.  He continues with use of hydrocodone 7.5/325 3/day, Gabapentin 600 mg 3/day, and Diclofenac.  Reports moderate reduction in pain with current regimen, denies adverse reactions.       Left adrenal tumor.  Biopsy benign.      Scheduled for EGD next week. Having epigastric pain/burning.       Previous failure with lumbar CARIN's.  No MBB\"s     Reports bilateral low back pain.  Bandlike distribution.  Worse with prolonged sitting and standing. Improved with medication, rest.      Back Pain   This is a chronic problem. The current episode started more than 1 year ago. The problem occurs constantly. The problem has been waxing and waning since onset. The pain is present in the lumbar spine. The quality of the pain is described as aching and burning. The pain radiates to the left thigh. The pain is at a severity of 4/10. Exacerbated by: activity. Associated symptoms include numbness (left leg) and weakness (left leg). Pertinent negatives include no bladder incontinence or bowel incontinence. Risk factors include lack of exercise and obesity. He has tried NSAIDs and analgesics for the symptoms. The treatment provided mild relief.      Past pain medications: Hydrocodone 5/325 and 7.5/325 QID - not very effective  Hydrocodone 10/325 mg qid - effective - decrease pain level down to a 2-3. Able to be more active, perform more daily activities.   Mobic 15 mg daily - helping      Current pain medications:   hydro/apap 7.5-325 mg TID  diclofenac-misoprostol 50-0.2 mg bid prn - does not take every day.   Gabapentin 600 mg BID - helping  Elavil 50 mg qhs - helping him sleep      Past therapies:  Physical Therapy: yes - last " "visit was over 1 year ago.   Chiropractor: yes  Massage Therapy: no  TENS: yes - used at PT and chiropractor  Neck or back surgery: no      Previous Injection: Lumbar Epidural Steroid Injection at  L5/S1 on 8/23/17  Effect of Injection (%): 15%  Length of Relief: 3-5 days       Previous Injections: yes, in his lower back at [facility] x 3 - minimal benefit.   Effect of Injection (%): 0%, he said if it had any affect it did not last long    PEG Assessment   What number best describes your pain on average in the past week?5  What number best describes how, during the past week, pain has interfered with your enjoyment of life?7  What number best describes how, during the past week, pain has interfered with your general activity?  7    The following portions of the patient's history were reviewed and updated as appropriate: allergies, current medications, past family history, past medical history, past social history, past surgical history and problem list.    Review of Systems   Constitutional: Positive for activity change (decreased) and fatigue.   HENT: Positive for congestion.    Eyes: Negative for visual disturbance.   Respiratory: Positive for cough, shortness of breath and wheezing.    Cardiovascular: Negative.    Gastrointestinal: Negative for bowel incontinence, constipation and diarrhea.   Genitourinary: Negative for bladder incontinence and difficulty urinating.   Musculoskeletal: Positive for back pain.   Neurological: Positive for weakness (left leg) and numbness (left leg).   Psychiatric/Behavioral: Positive for sleep disturbance. Negative for suicidal ideas. The patient is not nervous/anxious.        Vitals:    07/13/18 1036   BP: 126/80   Pulse: 79   Resp: 16   Temp: 98.2 °F (36.8 °C)   SpO2: 94%   Weight: 112 kg (246 lb)   Height: 175.3 cm (69.02\")   PainSc: 4  Comment: LBP ranges from 4-7/10   PainLoc: Back     Objective   Physical Exam   Constitutional: He is oriented to person, " place, and time. He appears well-developed and well-nourished.   HENT:   Head: Normocephalic and atraumatic.   Nose: Nose normal.   Mouth/Throat: Oropharynx is clear and moist.   Eyes: Conjunctivae and EOM are normal.   Neck: Normal range of motion. Neck supple.   Pulmonary/Chest: Effort normal. No respiratory distress.   Musculoskeletal:        Lumbar back: He exhibits tenderness and pain.   +lumbar facet tenderness/loading    wearing lumbar brace   Neurological: He is alert and oriented to person, place, and time. Gait normal.   Reflex Scores:       Patellar reflexes are 2+ on the right side and 2+ on the left side.  Skin: Skin is warm and dry.   Psychiatric: He has a normal mood and affect. His speech is normal and behavior is normal.   Nursing note and vitals reviewed.    Assessment/Plan   Jono was seen today for back pain.    Diagnoses and all orders for this visit:    Other chronic pain    DDD (degenerative disc disease), lumbar  -     HYDROcodone-acetaminophen (NORCO) 7.5-325 MG per tablet; Take 1 tablet by mouth 3 (Three) Times a Day.    Chronic bilateral low back pain with left-sided sciatica    Encounter for monitoring opioid maintenance therapy    Chronic bilateral low back pain with bilateral sciatica  -     HYDROcodone-acetaminophen (NORCO) 7.5-325 MG per tablet; Take 1 tablet by mouth 3 (Three) Times a Day.    Lumbar spondylolysis  -     Case Request      --- Bilateral L3-L5 MBB X 2, 2-4 weeks apart.  -------  Education about Medial Branch Blockade and RF Therapy:    This medial branch blockade (MBB) suggested is intended for diagnostic purposes, with the intent of offering the patient Radiofrequency thermal rhizotomy (RF) if the MBB is diagnostically effective.  The diagnostic blockade is necessary to determine the likelihood that RF therapy could be efficacious in providing long term relief to the patient.    Medial branches are sensory nerve branches that connect to a facet joint and transmit  "sensations & pain signals from that joint.  Facet is a term for the type of joints found in the spine.  Medial branches are the nerves that go to a facet, and therefore are also sometimes called \"facet joint nerves\" (FJNs).      In a medial branch blockade procedure, xray fluoroscopy is used to verify the locations of the outside of the joint lines which are being targeted.  Under xray guidance, needles are placed to these areas.  Contrast dye is injected to confirm proper placement, with dye flowing over the joint area, and to ensure that the dye does not flow into unintended areas such as a vein.  When this is confirmed, local anesthetic is injected to block the medial branch at that joint level.      If MBBs are diagnostically successful in blocking pain, then the patient is most likely a great candidate for Radiofrequency of those facet joint nerves.  In the RF procedure, needles are placed to the joint lines in the same fashion, and after testing, the needle tips are heated to thermally treat the nerves, blocking the nerves by in essence damaging the nerves with the heat treatment.       Medically, a successful RF procedure should provide a patient with 50% pain relief or more for at least 6 months.  Clinical experience suggests that successful patients receive relief more in the range of 12 months on average.  We also discussed that a fortunate minority of patients receive therapeutic success from the MBB, and may not require RF ablation.  If a patient receives more than 8 weeks of relief from MBB, then occasional repeat MBB for therapeutic purposes is a very reasonable alternative therapy.  This course of therapy is consistent with our LCDs according to our CMS  in the area, and therefore other insurance providers should follow accordingly.  We will monitor our patients to screen for these therapeutic responders and will offer RF therapy only when necessary.        We discussed that MBB & RF are " not without risks.  Guidelines regarding anticoagulant use & neuraxial procedures will be respected.  Patients that are ill or otherwise may be at risk for sepsis will not have their spines accessed by neuraxial injections of any type.  This patient will not be offered these therapies if there is an increased risk.   We discussed that there is a risk of postprocedural pain and also a risk of worsening of clinical picture with these procedures as with any neuraxial procedure.    -------    --- Refill hydrocodone. Patient appears stable with current regimen. No adverse effects. Regarding continuation of opioids, there is no evidence of aberrant behavior or any red flags.  The patient continues with appropriate response to opioid therapy. ADL's remain intact by self.   --- Routine UDS in office today as part of monitoring requirements for controlled substances.  The specimen was viewed and the immunoassay result reviewed and is +OPI, OXY, TCA.  This specimen will be sent to [facility] for confirmation.     --- Follow-up 2 months          LUISA REPORT    As part of the patient's treatment plan, I am prescribing controlled substances. The patient has been made aware of appropriate use of such medications, including potential risk of somnolence, limited ability to drive and/or work safely, and the potential for dependence or overdose. It has also bee made clear that these medications are for use by this patient only, without concomitant use of alcohol or other substances unless prescribed.     Patient has completed prescribing agreement detailing terms of continued prescribing of controlled substances, including monitoring LUISA reports, urine drug screening, and pill counts if necessary. The patient is aware that inappropriate use will results in cessation of prescribing such medications.    LUISA report has been reviewed and scanned into the patient's chart.    As the clinician, I personally reviewed the  LUISA from 7/12/2018 while the patient was in the office today.    History and physical exam exhibit continued safe and appropriate use of controlled substances.    EMR Dragon/Transcription disclaimer:   Much of this encounter note is an electronic transcription/translation of spoken language to printed text. The electronic translation of spoken language may permit erroneous, or at times, nonsensical words or phrases to be inadvertently transcribed; Although I have reviewed the note for such errors, some may still exist.

## 2018-07-17 RX ORDER — ZOLPIDEM TARTRATE 10 MG/1
10 TABLET ORAL NIGHTLY PRN
COMMUNITY
End: 2018-09-13

## 2018-07-18 ENCOUNTER — HOSPITAL ENCOUNTER (OUTPATIENT)
Facility: HOSPITAL | Age: 43
Setting detail: HOSPITAL OUTPATIENT SURGERY
Discharge: HOME | End: 2018-07-18
Attending: INTERNAL MEDICINE | Admitting: INTERNAL MEDICINE

## 2018-07-18 VITALS
TEMPERATURE: 97.9 F | BODY MASS INDEX: 35.87 KG/M2 | WEIGHT: 242.2 LBS | HEIGHT: 69 IN | RESPIRATION RATE: 16 BRPM | OXYGEN SATURATION: 94 % | SYSTOLIC BLOOD PRESSURE: 134 MMHG | HEART RATE: 81 BPM | DIASTOLIC BLOOD PRESSURE: 82 MMHG

## 2018-07-18 LAB — POTASSIUM BLD-SCNC: 3.9 MMOL/L (ref 3.5–5.2)

## 2018-07-18 PROCEDURE — 84132 ASSAY OF SERUM POTASSIUM: CPT | Performed by: NURSE ANESTHETIST, CERTIFIED REGISTERED

## 2018-07-18 RX ORDER — LIDOCAINE HYDROCHLORIDE 10 MG/ML
0.5 INJECTION, SOLUTION EPIDURAL; INFILTRATION; INTRACAUDAL; PERINEURAL ONCE AS NEEDED
Status: DISCONTINUED | OUTPATIENT
Start: 2018-07-18 | End: 2018-07-18 | Stop reason: HOSPADM

## 2018-07-18 RX ORDER — SODIUM CHLORIDE, SODIUM LACTATE, POTASSIUM CHLORIDE, CALCIUM CHLORIDE 600; 310; 30; 20 MG/100ML; MG/100ML; MG/100ML; MG/100ML
9 INJECTION, SOLUTION INTRAVENOUS CONTINUOUS
Status: DISCONTINUED | OUTPATIENT
Start: 2018-07-18 | End: 2018-07-18 | Stop reason: HOSPADM

## 2018-07-18 RX ORDER — SODIUM CHLORIDE 0.9 % (FLUSH) 0.9 %
1-10 SYRINGE (ML) INJECTION AS NEEDED
Status: DISCONTINUED | OUTPATIENT
Start: 2018-07-18 | End: 2018-07-18 | Stop reason: HOSPADM

## 2018-07-18 RX ORDER — SODIUM CHLORIDE 9 MG/ML
40 INJECTION, SOLUTION INTRAVENOUS AS NEEDED
Status: DISCONTINUED | OUTPATIENT
Start: 2018-07-18 | End: 2018-07-18 | Stop reason: HOSPADM
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
## 2018-07-24 ENCOUNTER — TELEPHONE (OUTPATIENT)
Dept: GASTROENTEROLOGY | Facility: CLINIC | Age: 43
End: 2018-07-24

## 2018-07-24 DIAGNOSIS — R10.10 PAIN OF UPPER ABDOMEN: Primary | ICD-10-CM

## 2018-07-24 DIAGNOSIS — K21.9 GASTROESOPHAGEAL REFLUX DISEASE, ESOPHAGITIS PRESENCE NOT SPECIFIED: ICD-10-CM

## 2018-08-07 DIAGNOSIS — M54.41 CHRONIC BILATERAL LOW BACK PAIN WITH BILATERAL SCIATICA: ICD-10-CM

## 2018-08-07 DIAGNOSIS — G89.29 CHRONIC BILATERAL LOW BACK PAIN WITH BILATERAL SCIATICA: ICD-10-CM

## 2018-08-07 DIAGNOSIS — M54.42 CHRONIC BILATERAL LOW BACK PAIN WITH BILATERAL SCIATICA: ICD-10-CM

## 2018-08-07 DIAGNOSIS — M51.36 DDD (DEGENERATIVE DISC DISEASE), LUMBAR: ICD-10-CM

## 2018-08-07 RX ORDER — HYDROCODONE BITARTRATE AND ACETAMINOPHEN 7.5; 325 MG/1; MG/1
1 TABLET ORAL 3 TIMES DAILY
Qty: 90 TABLET | Refills: 0 | Status: SHIPPED | OUTPATIENT
Start: 2018-08-07 | End: 2018-09-13 | Stop reason: SDUPTHER

## 2018-08-07 NOTE — TELEPHONE ENCOUNTER
Medication Refill Request    Date of phone call: 8/6/18    Medication being requested: Hydrocodone-apap 7.5 sig: 3xday  Qty: 90    Date of last visit: 7/13/18    Date of last refill: 7/13/18    LUISA up to date?: 7/12/18    Next Follow up?: 9/13/18    Any new pertinent information? (i.e, new medication allergies, new use of medications, change in patient's health or condition, non-compliance or inconsistency with prescribing agreement?): n/a

## 2018-08-14 ENCOUNTER — DOCUMENTATION (OUTPATIENT)
Dept: PAIN MEDICINE | Facility: CLINIC | Age: 43
End: 2018-08-14

## 2018-08-14 ENCOUNTER — OUTSIDE FACILITY SERVICE (OUTPATIENT)
Dept: PAIN MEDICINE | Facility: CLINIC | Age: 43
End: 2018-08-14

## 2018-08-14 PROCEDURE — 64493 INJ PARAVERT F JNT L/S 1 LEV: CPT | Performed by: ANESTHESIOLOGY

## 2018-08-14 PROCEDURE — 64494 INJ PARAVERT F JNT L/S 2 LEV: CPT | Performed by: ANESTHESIOLOGY

## 2018-08-14 NOTE — PROGRESS NOTES
Bilateral L3-5 Lumbar Medial Branch Blockade  [facility]      PREOPERATIVE DIAGNOSIS:  Lumbar spondylosis without myelopathy    POSTOPERATIVE DIAGNOSIS:  Lumbar spondylosis without myelopathy    PROCEDURE:   Diagnostic Bilateral Lumbar Medial Branch Nerve Blockades, with fluoroscopy:  L3, L4, and L5 nerves (at the L4 & L5 transverse processes and the sacral alar groove) to block facet joints L4-5, and L5-S1  1. 73813-70 -- Bilateral Lumbar Facet blocks, 1st Level  2. [id number] -- Bilateral Lumbar Facet blocks, 2nd  Level    PRE-PROCEDURE DISCUSSION WITH PATIENT:    Risks and complications were discussed with the patient prior to starting the procedure and informed consent was obtained.      SURGEON:  Mikey Barrera MD    REASON FOR PROCEDURE:    The patient complains of pain that seems to have a significant axial component    SEDATION:  Versed 8mg IV and The use of increased procedural sedation was carefully considered, and for this particular patient the need for additional procedural sedation seemed necessary in this instance to safely perform the procedure.  ANESTHETIC:  Marcaine 0.5%  STEROID:  Methylprednisolone (DEPO MEDROL) 60mg  TOTAL VOLUME OF SOLUTION:  6ml    DESCRIPTON OF PROCEDURE:  After obtaining informed consent, IV access was obtained in the preoperative area.   The patient was taken to the operating room.  The patient was placed in the prone position with a pillow under the abdomen. All pressure points were well padded.  EKG, blood pressure, and pulse oximeter were monitored.  The patient was monitored and sedated by the RN under my direction. The lumbosacral area was prepped with Chloraprep and draped in a sterile fashion. Under fluoroscopic guidance the transverse processes of the L4 and L5 vertebrae at the junctions of the superior articular processes were identified on the right. Also identified was the groove between the ala and the superior articular process of the sacrum  on the ipsilateral side.  Skin and subcutaneous tissue were anesthetized with 1% lidocaine above each of these points. A 22-gauge spinal needle was introduced under fluoroscopic guidance at the above junctions. Aspiration was negative for blood and CSF.  After confirming the position of the needle with fluoroscope in all views, 0.25 mL of Omnipaque was injected, and after seeing the proper spread a total of 1 mL of the anesthetic solution noted above was injected at each of these points.  Needles were removed intact from each of the areas.  A similar procedure was repeated to block the L3, L4, and L5 nerves on the contralateral side.   Onset of analgesia was noted.  Vital signs remained stable throughout.      ESTIMATED BLOOD LOSS:  <5 mL  SPECIMENS:  none    COMPLICATIONS:   No complications were noted., There was no indication of vascular uptake on live injection of contrast dye. and The patient did not have any signs of postprocedure numbness nor weakness.  He does admit to some chronic LLE sciatica and radicular-like components with weakness    TOLERANCE & DISCHARGE CONDITION:    The patient tolerated the procedure well.  The patient was transported to the recovery area without difficulties.  The patient was discharged to home under the care of family in stable and satisfactory condition.    PLAN OF CARE:  1. The patient was given our standard instruction sheet.  2. We discussed that Lumbar Medial Branch Blockade is a diagnostic procedure in consideration for radiofrequency ablation if two diagnostic procedures prove to be positive for significant benefit.  If sustained relief of 6 to eight weeks is obtained, then an alternative plan could be therapeutic lumbar branch blockades.  3. The patient is asked to keep a pain log each hour for 8 hours after the procedure today.  4. The patient will  Repeat injection 2 wks.  5. The patient will resume all medications as per the medication reconciliation sheet.

## 2018-08-21 ENCOUNTER — PREP FOR SURGERY (OUTPATIENT)
Dept: OTHER | Facility: HOSPITAL | Age: 43
End: 2018-08-21

## 2018-08-21 ENCOUNTER — ANESTHESIA EVENT (OUTPATIENT)
Dept: PERIOP | Facility: HOSPITAL | Age: 43
End: 2018-08-21

## 2018-08-22 ENCOUNTER — HOSPITAL ENCOUNTER (OUTPATIENT)
Facility: HOSPITAL | Age: 43
Setting detail: HOSPITAL OUTPATIENT SURGERY
Discharge: HOME | End: 2018-08-22
Attending: INTERNAL MEDICINE | Admitting: INTERNAL MEDICINE

## 2018-08-22 ENCOUNTER — ANESTHESIA (OUTPATIENT)
Dept: PERIOP | Facility: HOSPITAL | Age: 43
End: 2018-08-22

## 2018-08-22 VITALS
BODY MASS INDEX: 35.87 KG/M2 | HEART RATE: 87 BPM | HEIGHT: 69 IN | DIASTOLIC BLOOD PRESSURE: 73 MMHG | RESPIRATION RATE: 16 BRPM | WEIGHT: 242.2 LBS | SYSTOLIC BLOOD PRESSURE: 124 MMHG | TEMPERATURE: 98 F | OXYGEN SATURATION: 95 %

## 2018-08-22 DIAGNOSIS — R10.10 PAIN OF UPPER ABDOMEN: ICD-10-CM

## 2018-08-22 DIAGNOSIS — K21.9 GASTROESOPHAGEAL REFLUX DISEASE, ESOPHAGITIS PRESENCE NOT SPECIFIED: ICD-10-CM

## 2018-08-22 PROCEDURE — 43239 EGD BIOPSY SINGLE/MULTIPLE: CPT | Performed by: INTERNAL MEDICINE

## 2018-08-22 PROCEDURE — 25010000002 PROPOFOL 10 MG/ML EMULSION: Performed by: NURSE ANESTHETIST, CERTIFIED REGISTERED

## 2018-08-22 RX ORDER — LIDOCAINE HYDROCHLORIDE 20 MG/ML
INJECTION, SOLUTION INFILTRATION; PERINEURAL AS NEEDED
Status: DISCONTINUED | OUTPATIENT
Start: 2018-08-22 | End: 2018-08-22 | Stop reason: SURG

## 2018-08-22 RX ORDER — LIDOCAINE HYDROCHLORIDE 10 MG/ML
0.5 INJECTION, SOLUTION EPIDURAL; INFILTRATION; INTRACAUDAL; PERINEURAL ONCE AS NEEDED
Status: DISCONTINUED | OUTPATIENT
Start: 2018-08-22 | End: 2018-08-22 | Stop reason: HOSPADM

## 2018-08-22 RX ORDER — SODIUM CHLORIDE, SODIUM LACTATE, POTASSIUM CHLORIDE, CALCIUM CHLORIDE 600; 310; 30; 20 MG/100ML; MG/100ML; MG/100ML; MG/100ML
100 INJECTION, SOLUTION INTRAVENOUS CONTINUOUS
Status: CANCELLED | OUTPATIENT
Start: 2018-08-22

## 2018-08-22 RX ORDER — IPRATROPIUM BROMIDE AND ALBUTEROL SULFATE 2.5; .5 MG/3ML; MG/3ML
3 SOLUTION RESPIRATORY (INHALATION) EVERY 4 HOURS PRN
COMMUNITY

## 2018-08-22 RX ORDER — ONDANSETRON 2 MG/ML
4 INJECTION INTRAMUSCULAR; INTRAVENOUS ONCE AS NEEDED
Status: CANCELLED | OUTPATIENT
Start: 2018-08-22

## 2018-08-22 RX ORDER — PROPOFOL 10 MG/ML
VIAL (ML) INTRAVENOUS AS NEEDED
Status: DISCONTINUED | OUTPATIENT
Start: 2018-08-22 | End: 2018-08-22 | Stop reason: SURG

## 2018-08-22 RX ORDER — SODIUM CHLORIDE 9 MG/ML
40 INJECTION, SOLUTION INTRAVENOUS AS NEEDED
Status: DISCONTINUED | OUTPATIENT
Start: 2018-08-22 | End: 2018-08-22 | Stop reason: HOSPADM

## 2018-08-22 RX ORDER — SODIUM CHLORIDE, SODIUM LACTATE, POTASSIUM CHLORIDE, CALCIUM CHLORIDE 600; 310; 30; 20 MG/100ML; MG/100ML; MG/100ML; MG/100ML
9 INJECTION, SOLUTION INTRAVENOUS CONTINUOUS
Status: DISCONTINUED | OUTPATIENT
Start: 2018-08-22 | End: 2018-08-22 | Stop reason: HOSPADM

## 2018-08-22 RX ORDER — SODIUM CHLORIDE 0.9 % (FLUSH) 0.9 %
1-10 SYRINGE (ML) INJECTION AS NEEDED
Status: DISCONTINUED | OUTPATIENT
Start: 2018-08-22 | End: 2018-08-22 | Stop reason: HOSPADM

## 2018-08-22 RX ADMIN — PROPOFOL 50 MG: 10 INJECTION, EMULSION INTRAVENOUS at 11:25

## 2018-08-22 RX ADMIN — PROPOFOL 50 MG: 10 INJECTION, EMULSION INTRAVENOUS at 11:29

## 2018-08-22 RX ADMIN — PROPOFOL 100 MG: 10 INJECTION, EMULSION INTRAVENOUS at 11:22

## 2018-08-22 RX ADMIN — SODIUM CHLORIDE, POTASSIUM CHLORIDE, SODIUM LACTATE AND CALCIUM CHLORIDE: 600; 310; 30; 20 INJECTION, SOLUTION INTRAVENOUS at 11:19

## 2018-08-22 RX ADMIN — PROPOFOL 50 MG: 10 INJECTION, EMULSION INTRAVENOUS at 11:34

## 2018-08-22 RX ADMIN — LIDOCAINE HYDROCHLORIDE 80 MG: 20 INJECTION, SOLUTION INFILTRATION; PERINEURAL at 11:20

## 2018-08-22 RX ADMIN — PROPOFOL 50 MG: 10 INJECTION, EMULSION INTRAVENOUS at 11:28

## 2018-08-22 NOTE — ANESTHESIA PREPROCEDURE EVALUATION
Anesthesia Evaluation     Patient summary reviewed and Nursing notes reviewed   no history of anesthetic complications:  NPO Solid Status: > 8 hours  NPO Liquid Status: > 8 hours           Airway   Mallampati: II  TM distance: >3 FB  Neck ROM: full  Dental    (+) poor dentition    Pulmonary - normal exam    breath sounds clear to auscultation  (+) pneumonia (every three months, last 3 months ago) resolved , a smoker (quit 1 week ago , 30 pk yr hx) Former, COPD mild, shortness of breath, sleep apnea on CPAP,     ROS comment: Room air SaO2 95  Cardiovascular   Exercise tolerance: poor (<4 METS)    ECG reviewed  Patient on routine beta blocker and Beta blocker given within 24 hours of surgery  Rhythm: regular  Rate: normal    (+) hypertension well controlled, dysrhythmias (H/O SVT last episode 1 yr ago), HATCHSHAE comment: 12/17      Similar to previous ECG  Rhythm: sinus rhythm    · The left ventricular cavity is mildly dilated.  · Mild mitral valve regurgitation is present  · NORMAL GLOBAL STRAIN -22  · Left Ventricle: Calculated EF = 61.3%.  · Mild mitral valve regurgitation  · There is no evidence of pericardial effusion    Neuro/Psych  (+) numbness (bila legs), psychiatric history Anxiety and Depression,     GI/Hepatic/Renal/Endo    (+) obesity,  GERD well controlled,      Musculoskeletal     (+) back pain,   Abdominal   (+) obese,    Substance History - negative use     OB/GYN          Other   (+) arthritis                     Anesthesia Plan    ASA 3     MAC     Anesthetic plan and risks discussed with patient.  Use of blood products discussed with patient  Consented to blood products.

## 2018-08-22 NOTE — OP NOTE
EGD Procedure Note        Indication:  Abdominal pain    Consent: Procedure of EGD was explained to the patient in detail including but not limited to the complications of bleeding perforation and possible reactions to sedation.  She understood all this and was willing to proceed.    Anesthesia: Sedation was provided by anesthesia.    Procedure:  After excellent sedation a flexible endoscope was passed into the oropharynx into the distal esophagus.  Small sliding hiatal hernia is noted.  Scope was easily traversed into the stomach although into the antrum.  There is moderate amount of gastritis noted in the antrum and body and biopsies are obtained.  The scope was retroflexed year straightened and passed into the duodenal bulb although into the second portion with ease.  Small bowel biopsies are obtained.  The entire examined small bowel mucosa overall does appear normal.  The scope was then slowly withdrawn out of the patient with no immediate, patient's and he tolerated the procedure well.        Impression/Plan:  Gastritis  Sliding hiatal hernia  We'll await biopsy results.  He'll see me back in the office in 2 weeks.

## 2018-08-22 NOTE — ANESTHESIA POSTPROCEDURE EVALUATION
Patient: Jono Garza    Procedure Summary     Date:  08/22/18 Room / Location:   [facility] /  [facility]    Anesthesia Start:  1119 Anesthesia Stop:  1144    Procedure:  ESOPHAGOGASTRODUODENOSCOPY WITH BIOPSIES (N/A Esophagus) Diagnosis:       Pain of upper abdomen      Gastroesophageal reflux disease, esophagitis presence not specified      (Pain of upper abdomen [R10.10])      (Gastroesophageal reflux disease, esophagitis presence not specified [K21.9])    Surgeon:  Della Crook MD Provider:  Natacha Ferguson CRNA    Anesthesia Type:  MAC ASA Status:  3          Anesthesia Type: MAC  Last vitals  BP   139/95 (08/22/18 1150)   Temp   98 °F (36.7 °C) (08/22/18 1146)   Pulse   80 (08/22/18 1150)   Resp   18 (08/22/18 1150)     SpO2   93 % (08/22/18 1150)     Post Anesthesia Care and Evaluation    Patient location during evaluation: PHASE II  Patient participation: complete - patient participated  Level of consciousness: awake and alert  Pain score: 0  Pain management: adequate  Airway patency: patent  Anesthetic complications: No anesthetic complications  PONV Status: none  Cardiovascular status: acceptable  Respiratory status: acceptable  Hydration status: acceptable
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
## 2018-08-25 LAB
LAB AP CASE REPORT: NORMAL
PATH REPORT.FINAL DX SPEC: NORMAL

## 2018-09-02 DIAGNOSIS — M51.36 DDD (DEGENERATIVE DISC DISEASE), LUMBAR: ICD-10-CM

## 2018-09-02 DIAGNOSIS — G89.29 CHRONIC BILATERAL LOW BACK PAIN WITH LEFT-SIDED SCIATICA: ICD-10-CM

## 2018-09-02 DIAGNOSIS — M54.42 CHRONIC BILATERAL LOW BACK PAIN WITH LEFT-SIDED SCIATICA: ICD-10-CM

## 2018-09-02 DIAGNOSIS — K31.89 STOMACH IRRITATION: ICD-10-CM

## 2018-09-05 RX ORDER — DICLOFENAC SODIUM AND MISOPROSTOL 50; 200 MG/1; UG/1
TABLET, DELAYED RELEASE ORAL
Qty: 60 TABLET | Refills: 3 | Status: SHIPPED | OUTPATIENT
Start: 2018-09-05 | End: 2018-11-06

## 2018-09-13 ENCOUNTER — OFFICE VISIT (OUTPATIENT)
Dept: PAIN MEDICINE | Facility: CLINIC | Age: 43
End: 2018-09-13

## 2018-09-13 VITALS
HEART RATE: 93 BPM | HEIGHT: 69 IN | DIASTOLIC BLOOD PRESSURE: 92 MMHG | SYSTOLIC BLOOD PRESSURE: 136 MMHG | RESPIRATION RATE: 15 BRPM | WEIGHT: 246 LBS | BODY MASS INDEX: 36.43 KG/M2 | TEMPERATURE: 96.5 F | OXYGEN SATURATION: 95 %

## 2018-09-13 DIAGNOSIS — G89.29 CHRONIC BILATERAL LOW BACK PAIN WITH LEFT-SIDED SCIATICA: ICD-10-CM

## 2018-09-13 DIAGNOSIS — G89.29 CHRONIC BILATERAL LOW BACK PAIN WITH BILATERAL SCIATICA: ICD-10-CM

## 2018-09-13 DIAGNOSIS — M54.42 CHRONIC BILATERAL LOW BACK PAIN WITH LEFT-SIDED SCIATICA: ICD-10-CM

## 2018-09-13 DIAGNOSIS — Z51.81 ENCOUNTER FOR MONITORING OPIOID MAINTENANCE THERAPY: ICD-10-CM

## 2018-09-13 DIAGNOSIS — M43.06 LUMBAR SPONDYLOLYSIS: ICD-10-CM

## 2018-09-13 DIAGNOSIS — M54.42 CHRONIC BILATERAL LOW BACK PAIN WITH BILATERAL SCIATICA: ICD-10-CM

## 2018-09-13 DIAGNOSIS — Z79.891 ENCOUNTER FOR MONITORING OPIOID MAINTENANCE THERAPY: ICD-10-CM

## 2018-09-13 DIAGNOSIS — M51.36 DDD (DEGENERATIVE DISC DISEASE), LUMBAR: ICD-10-CM

## 2018-09-13 DIAGNOSIS — M54.41 CHRONIC BILATERAL LOW BACK PAIN WITH BILATERAL SCIATICA: ICD-10-CM

## 2018-09-13 DIAGNOSIS — G89.29 OTHER CHRONIC PAIN: Primary | ICD-10-CM

## 2018-09-13 PROCEDURE — 99214 OFFICE O/P EST MOD 30 MIN: CPT | Performed by: NURSE PRACTITIONER

## 2018-09-13 RX ORDER — TRAZODONE HYDROCHLORIDE 50 MG/1
150 TABLET ORAL NIGHTLY
COMMUNITY

## 2018-09-13 RX ORDER — HYDROCODONE BITARTRATE AND ACETAMINOPHEN 7.5; 325 MG/1; MG/1
1 TABLET ORAL 3 TIMES DAILY
Qty: 90 TABLET | Refills: 0 | Status: SHIPPED | OUTPATIENT
Start: 2018-09-13 | End: 2018-10-04 | Stop reason: SDUPTHER

## 2018-09-13 NOTE — PROGRESS NOTES
CHIEF COMPLAINT  F/U back pain. Pt states he had relief from injection up until Sunday night when he slipped and fell and landed on back. States he slipped on the bottom step of his stairs.    Subjective   Jono Garza is a 43 y.o. male  who presents to the office for follow-up of procedure.  He completed a Bilateral L3-5 Lumbar Medial Branch Blockade   on  8-14-18 performed by Dr. Duarte for management of back pain. Patient reports 90% relief from the procedure the first day and 50% reduction for almost a month.       C/o low back pain. Today he reprots that his pain is 5/10 in severity.  He continues with use of hydrocodone 7.5/325 3/day, Gabapentin 600 mg 3/day, and Diclofenac.  Reports moderate reduction in pain with current regimen, denies adverse reactions.      Back Pain   This is a chronic problem. The current episode started more than 1 year ago. The problem occurs constantly. The problem has been waxing and waning since onset. The pain is present in the lumbar spine. The quality of the pain is described as aching and burning. The pain radiates to the left thigh. The pain is at a severity of 5/10. Exacerbated by: activity. Associated symptoms include numbness and weakness. Pertinent negatives include no abdominal pain, bladder incontinence, bowel incontinence, chest pain, dysuria, fever or headaches. Risk factors include lack of exercise and obesity. He has tried NSAIDs and analgesics for the symptoms. The treatment provided mild relief.      Past pain medications: Hydrocodone 5/325 and 7.5/325 QID - not very effective  Hydrocodone 10/325 mg qid - effective - decrease pain level down to a 2-3. Able to be more active, perform more daily activities.   Mobic 15 mg daily - helping      Current pain medications:   hydro/apap 7.5-325 mg TID  diclofenac-misoprostol 50-0.2 mg bid prn - does not take every day.   Gabapentin 600 mg BID - helping  Elavil 50 mg qhs - helping him sleep      Past  therapies:  Physical Therapy: yes - last visit was over 1 year ago.   Chiropractor: yes  Massage Therapy: no  TENS: yes - used at PT and chiropractor  Neck or back surgery: no      Previous Injections: bilateral L3-L5 MBB 8/14/18  Effect of Injection (%): 90%  Length of Relief: 24 hours (50% X 1 month)    Previous Injection: Lumbar Epidural Steroid Injection at  L5/S1 on 8/23/17  Effect of Injection (%): 15%  Length of Relief: 3-5 days       Previous Injections: yes, in his lower back at [facility]- LES x 3 - minimal benefit.   Effect of Injection (%): 0%, he said if it had any affect it did not last long     PEG Assessment   What number best describes your pain on average in the past week?6  What number best describes how, during the past week, pain has interfered with your enjoyment of life?6  What number best describes how, during the past week, pain has interfered with your general activity?  7    The following portions of the patient's history were reviewed and updated as appropriate: allergies, current medications, past family history, past medical history, past social history, past surgical history and problem list.    Review of Systems   Constitutional: Positive for activity change. Negative for chills and fever.   Respiratory: Negative for cough, shortness of breath and wheezing.    Cardiovascular: Negative for chest pain.   Gastrointestinal: Negative for abdominal pain, bowel incontinence, constipation and diarrhea.   Genitourinary: Negative for bladder incontinence, difficulty urinating and dysuria.   Musculoskeletal: Positive for back pain.   Neurological: Positive for weakness and numbness. Negative for dizziness, light-headedness and headaches.   Psychiatric/Behavioral: Positive for sleep disturbance (trazadone for sleep helps). Negative for confusion, hallucinations and suicidal ideas. The patient is nervous/anxious.      Vitals:    09/13/18 1007   BP: 136/92   Pulse: 93   Resp: 15  "  Temp: 96.5 °F (35.8 °C)   SpO2: 95%   Weight: 112 kg (246 lb)   Height: 175.3 cm (69.02\")   PainSc:   5   PainLoc: Back         Objective   Physical Exam   Constitutional: He is oriented to person, place, and time. He appears well-developed and well-nourished. No distress.   HENT:   Head: Normocephalic and atraumatic.   Nose: Nose normal.   Mouth/Throat: Oropharynx is clear and moist.   Eyes: Conjunctivae and EOM are normal.   Neck: Normal range of motion. Neck supple.   Cardiovascular: Normal rate.    Pulmonary/Chest: Effort normal. No respiratory distress.   Musculoskeletal:        Lumbar back: He exhibits tenderness and pain.   +lumbar facet tenderness/loading    wearing lumbar brace   Neurological: He is alert and oriented to person, place, and time. He has normal strength. Gait normal.   Skin: Skin is warm and dry.   Psychiatric: He has a normal mood and affect. His speech is normal and behavior is normal.   Nursing note and vitals reviewed.      Assessment/Plan   Jono was seen today for back pain.    Diagnoses and all orders for this visit:    Other chronic pain    Chronic bilateral low back pain with left-sided sciatica    DDD (degenerative disc disease), lumbar  -     HYDROcodone-acetaminophen (NORCO) 7.5-325 MG per tablet; Take 1 tablet by mouth 3 (Three) Times a Day.    Lumbar spondylolysis  -     Cancel: Case Request  -     Case Request    Encounter for monitoring opioid maintenance therapy    Chronic bilateral low back pain with bilateral sciatica  -     HYDROcodone-acetaminophen (NORCO) 7.5-325 MG per tablet; Take 1 tablet by mouth 3 (Three) Times a Day.      --- Repeat bilateral L3-L5 MBB X 1.  THIS IS TO BE DONE WITH DR. IRWIN.    -------  Education about Medial Branch Blockade and RF Therapy:    This medial branch blockade (MBB) suggested is intended for diagnostic purposes, with the intent of offering the patient Radiofrequency thermal rhizotomy (RF) if the MBB is diagnostically effective. " " The diagnostic blockade is necessary to determine the likelihood that RF therapy could be efficacious in providing long term relief to the patient.    Medial branches are sensory nerve branches that connect to a facet joint and transmit sensations & pain signals from that joint.  Facet is a term for the type of joints found in the spine.  Medial branches are the nerves that go to a facet, and therefore are also sometimes called \"facet joint nerves\" (FJNs).      In a medial branch blockade procedure, xray fluoroscopy is used to verify the locations of the outside of the joint lines which are being targeted.  Under xray guidance, needles are placed to these areas.  Contrast dye is injected to confirm proper placement, with dye flowing over the joint area, and to ensure that the dye does not flow into unintended areas such as a vein.  When this is confirmed, local anesthetic is injected to block the medial branch at that joint level.      If MBBs are diagnostically successful in blocking pain, then the patient is most likely a great candidate for Radiofrequency of those facet joint nerves.  In the RF procedure, needles are placed to the joint lines in the same fashion, and after testing, the needle tips are heated to thermally treat the nerves, blocking the nerves by in essence damaging the nerves with the heat treatment.       Medically, a successful RF procedure should provide a patient with 50% pain relief or more for at least 6 months.  Clinical experience suggests that successful patients receive relief more in the range of 12 months on average.  We also discussed that a fortunate minority of patients receive therapeutic success from the MBB, and may not require RF ablation.  If a patient receives more than 8 weeks of relief from MBB, then occasional repeat MBB for therapeutic purposes is a very reasonable alternative therapy.  This course of therapy is consistent with our LCDs according to our CMS  " in the area, and therefore other insurance providers should follow accordingly.  We will monitor our patients to screen for these therapeutic responders and will offer RF therapy only when necessary.      We discussed that MBB & RF are not without risks.  Guidelines regarding anticoagulant use & neuraxial procedures will be respected.  Patients that are ill or otherwise may be at risk for sepsis will not have their spines accessed by neuraxial injections of any type.  This patient will not be offered these therapies if there is an increased risk.   We discussed that there is a risk of postprocedural pain and also a risk of worsening of clinical picture with these procedures as with any neuraxial procedure.    -------    --- Refill Hydrocodone. Patient appears stable with current regimen. No adverse effects. Regarding continuation of opioids, there is no evidence of aberrant behavior or any red flags.  The patient continues with appropriate response to opioid therapy. ADL's remain intact by self.   --- The urine drug screen confirmation from 7/13/18 has been reviewed and the result is appropriate based on patient history and LUISA report  --- Follow-up 2 months                LUISA REPORT    As part of the patient's treatment plan, I am prescribing controlled substances. The patient has been made aware of appropriate use of such medications, including potential risk of somnolence, limited ability to drive and/or work safely, and the potential for dependence or overdose. It has also bee made clear that these medications are for use by this patient only, without concomitant use of alcohol or other substances unless prescribed.     Patient has completed prescribing agreement detailing terms of continued prescribing of controlled substances, including monitoring LUISA reports, urine drug screening, and pill counts if necessary. The patient is aware that inappropriate use will results in cessation of prescribing such  medications.    LUISA report has been reviewed and scanned into the patient's chart.    As the clinician, I personally reviewed the LUISA from 9/12/2018 while the patient was in the office today.    History and physical exam exhibit continued safe and appropriate use of controlled substances.       EMR Dragon/Transcription disclaimer:   Much of this encounter note is an electronic transcription/translation of spoken language to printed text. The electronic translation of spoken language may permit erroneous, or at times, nonsensical words or phrases to be inadvertently transcribed; Although I have reviewed the note for such errors, some may still exist.

## 2018-09-25 ENCOUNTER — OFFICE VISIT (OUTPATIENT)
Dept: GASTROENTEROLOGY | Facility: CLINIC | Age: 43
End: 2018-09-25

## 2018-09-25 VITALS
BODY MASS INDEX: 36.58 KG/M2 | SYSTOLIC BLOOD PRESSURE: 122 MMHG | HEIGHT: 69 IN | DIASTOLIC BLOOD PRESSURE: 66 MMHG | WEIGHT: 247 LBS

## 2018-09-25 DIAGNOSIS — R10.12 LEFT UPPER QUADRANT PAIN: Primary | ICD-10-CM

## 2018-09-25 PROCEDURE — 99213 OFFICE O/P EST LOW 20 MIN: CPT | Performed by: INTERNAL MEDICINE

## 2018-09-27 ENCOUNTER — TELEPHONE (OUTPATIENT)
Dept: GASTROENTEROLOGY | Facility: CLINIC | Age: 43
End: 2018-09-27

## 2018-09-27 ENCOUNTER — TELEPHONE (OUTPATIENT)
Dept: PAIN MEDICINE | Facility: CLINIC | Age: 43
End: 2018-09-27

## 2018-09-28 ENCOUNTER — TELEPHONE (OUTPATIENT)
Dept: PAIN MEDICINE | Facility: CLINIC | Age: 43
End: 2018-09-28

## 2018-09-28 NOTE — TELEPHONE ENCOUNTER
----- Message from KEV Carrera sent at 9/13/2018 10:35 AM EDT -----  Regarding: random  Random uds and pill count

## 2018-09-28 NOTE — TELEPHONE ENCOUNTER
Spoke to pt and he states he will try to be here within the next 24 hours for UDS Pill count.   SP RN  [phone]

## 2018-10-04 DIAGNOSIS — M54.41 CHRONIC BILATERAL LOW BACK PAIN WITH BILATERAL SCIATICA: ICD-10-CM

## 2018-10-04 DIAGNOSIS — M51.36 DDD (DEGENERATIVE DISC DISEASE), LUMBAR: ICD-10-CM

## 2018-10-04 DIAGNOSIS — G89.29 CHRONIC BILATERAL LOW BACK PAIN WITH BILATERAL SCIATICA: ICD-10-CM

## 2018-10-04 DIAGNOSIS — M54.42 CHRONIC BILATERAL LOW BACK PAIN WITH BILATERAL SCIATICA: ICD-10-CM

## 2018-10-04 RX ORDER — HYDROCODONE BITARTRATE AND ACETAMINOPHEN 7.5; 325 MG/1; MG/1
1 TABLET ORAL 3 TIMES DAILY
Qty: 90 TABLET | Refills: 0 | Status: SHIPPED | OUTPATIENT
Start: 2018-10-04 | End: 2018-11-13 | Stop reason: SDUPTHER

## 2018-10-04 NOTE — TELEPHONE ENCOUNTER
Medication Refill Request    Date of phone call: 10/4/18    Medication being requested: Hydrocodone-apap 7.5 sig: 3xday  Qty: 90    Date of last visit: 9/13/18    Date of last refill: 9/13/18    LUISA up to date?: 10/4/18    Next Follow up?: 11/13/18    Any new pertinent information? (i.e, new medication allergies, new use of medications, change in patient's health or condition, non-compliance or inconsistency with prescribing agreement?): n/a

## 2018-10-05 DIAGNOSIS — R10.10 PAIN OF UPPER ABDOMEN: ICD-10-CM

## 2018-10-05 DIAGNOSIS — K21.9 GASTROESOPHAGEAL REFLUX DISEASE, ESOPHAGITIS PRESENCE NOT SPECIFIED: Primary | ICD-10-CM

## 2018-10-05 NOTE — TELEPHONE ENCOUNTER
Della Crook MD   to Juan Kwok MA        10/4/18 3:50 PM   Note      We can't get imaging, so have him come off ppi for 2 weeks and get fasting gastrin level

## 2018-10-22 ENCOUNTER — LAB (OUTPATIENT)
Dept: LAB | Facility: HOSPITAL | Age: 43
End: 2018-10-22
Attending: INTERNAL MEDICINE

## 2018-10-22 DIAGNOSIS — R10.10 PAIN OF UPPER ABDOMEN: ICD-10-CM

## 2018-10-22 DIAGNOSIS — K21.9 GASTROESOPHAGEAL REFLUX DISEASE, ESOPHAGITIS PRESENCE NOT SPECIFIED: ICD-10-CM

## 2018-10-22 LAB
ALBUMIN SERPL-MCNC: 3.9 G/DL (ref 3.5–5.2)
ALP SERPL-CCNC: 114 U/L (ref 40–129)
ALT SERPL W P-5'-P-CCNC: 35 U/L (ref 5–41)
AST SERPL-CCNC: 22 U/L (ref 5–40)
BILIRUB CONJ SERPL-MCNC: <0.2 MG/DL (ref 0.2–0.3)
BILIRUB INDIRECT SERPL-MCNC: ABNORMAL MG/DL
BILIRUB SERPL-MCNC: 0.2 MG/DL (ref 0.2–1.2)
LIPASE SERPL-CCNC: 30 U/L (ref 13–60)
PROT SERPL-MCNC: 7.7 G/DL (ref 6–8.5)

## 2018-10-22 PROCEDURE — 82941 ASSAY OF GASTRIN: CPT

## 2018-10-22 PROCEDURE — 80076 HEPATIC FUNCTION PANEL: CPT | Performed by: INTERNAL MEDICINE

## 2018-10-22 PROCEDURE — 36415 COLL VENOUS BLD VENIPUNCTURE: CPT | Performed by: INTERNAL MEDICINE

## 2018-10-22 PROCEDURE — 83690 ASSAY OF LIPASE: CPT | Performed by: INTERNAL MEDICINE

## 2018-10-23 RX ORDER — SUCRALFATE 1 G/1
TABLET ORAL
Qty: 90 TABLET | Refills: 2 | Status: SHIPPED | OUTPATIENT
Start: 2018-10-23 | End: 2019-01-22 | Stop reason: SDUPTHER

## 2018-10-24 LAB — GASTRIN SERPL-MCNC: 21 PG/ML (ref 0–115)

## 2018-11-01 DIAGNOSIS — G89.29 CHRONIC BILATERAL LOW BACK PAIN WITH BILATERAL SCIATICA: ICD-10-CM

## 2018-11-01 DIAGNOSIS — M54.41 CHRONIC BILATERAL LOW BACK PAIN WITH BILATERAL SCIATICA: ICD-10-CM

## 2018-11-01 DIAGNOSIS — M54.42 CHRONIC BILATERAL LOW BACK PAIN WITH BILATERAL SCIATICA: ICD-10-CM

## 2018-11-01 RX ORDER — GABAPENTIN 600 MG/1
TABLET ORAL
Qty: 90 TABLET | Refills: 4 | Status: SHIPPED | OUTPATIENT
Start: 2018-11-01 | End: 2019-03-30 | Stop reason: SDUPTHER

## 2018-11-03 DIAGNOSIS — G89.29 CHRONIC BILATERAL LOW BACK PAIN WITH BILATERAL SCIATICA: ICD-10-CM

## 2018-11-03 DIAGNOSIS — M54.41 CHRONIC BILATERAL LOW BACK PAIN WITH BILATERAL SCIATICA: ICD-10-CM

## 2018-11-03 DIAGNOSIS — M54.42 CHRONIC BILATERAL LOW BACK PAIN WITH BILATERAL SCIATICA: ICD-10-CM

## 2018-11-06 ENCOUNTER — OFFICE VISIT (OUTPATIENT)
Dept: GASTROENTEROLOGY | Facility: CLINIC | Age: 43
End: 2018-11-06

## 2018-11-06 ENCOUNTER — TRANSCRIBE ORDERS (OUTPATIENT)
Dept: ADMINISTRATIVE | Facility: HOSPITAL | Age: 43
End: 2018-11-06

## 2018-11-06 VITALS
DIASTOLIC BLOOD PRESSURE: 68 MMHG | BODY MASS INDEX: 36.7 KG/M2 | WEIGHT: 247.8 LBS | SYSTOLIC BLOOD PRESSURE: 128 MMHG | HEIGHT: 69 IN

## 2018-11-06 DIAGNOSIS — R10.12 LEFT UPPER QUADRANT PAIN: Primary | ICD-10-CM

## 2018-11-06 DIAGNOSIS — R91.1 PULMONARY NODULE: Primary | ICD-10-CM

## 2018-11-06 PROCEDURE — 99214 OFFICE O/P EST MOD 30 MIN: CPT | Performed by: INTERNAL MEDICINE

## 2018-11-06 RX ORDER — GABAPENTIN 600 MG/1
TABLET ORAL
Qty: 90 TABLET | Refills: 4 | OUTPATIENT
Start: 2018-11-06

## 2018-11-06 RX ORDER — PANTOPRAZOLE SODIUM 40 MG/1
40 TABLET, DELAYED RELEASE ORAL DAILY
Qty: 30 TABLET | Refills: 5 | Status: SHIPPED | OUTPATIENT
Start: 2018-11-06 | End: 2019-05-30 | Stop reason: SDUPTHER
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
## 2018-11-13 ENCOUNTER — OFFICE VISIT (OUTPATIENT)
Dept: PAIN MEDICINE | Facility: CLINIC | Age: 43
End: 2018-11-13

## 2018-11-13 VITALS
HEIGHT: 69 IN | WEIGHT: 247 LBS | DIASTOLIC BLOOD PRESSURE: 82 MMHG | RESPIRATION RATE: 16 BRPM | BODY MASS INDEX: 36.58 KG/M2 | HEART RATE: 81 BPM | OXYGEN SATURATION: 95 % | TEMPERATURE: 98.4 F | SYSTOLIC BLOOD PRESSURE: 125 MMHG

## 2018-11-13 DIAGNOSIS — M54.41 CHRONIC BILATERAL LOW BACK PAIN WITH BILATERAL SCIATICA: ICD-10-CM

## 2018-11-13 DIAGNOSIS — M51.36 DDD (DEGENERATIVE DISC DISEASE), LUMBAR: ICD-10-CM

## 2018-11-13 DIAGNOSIS — G89.29 OTHER CHRONIC PAIN: Primary | ICD-10-CM

## 2018-11-13 DIAGNOSIS — Z79.891 ENCOUNTER FOR MONITORING OPIOID MAINTENANCE THERAPY: ICD-10-CM

## 2018-11-13 DIAGNOSIS — G89.29 CHRONIC BILATERAL LOW BACK PAIN WITH LEFT-SIDED SCIATICA: ICD-10-CM

## 2018-11-13 DIAGNOSIS — M43.06 LUMBAR SPONDYLOLYSIS: ICD-10-CM

## 2018-11-13 DIAGNOSIS — M54.42 CHRONIC BILATERAL LOW BACK PAIN WITH BILATERAL SCIATICA: ICD-10-CM

## 2018-11-13 DIAGNOSIS — M54.42 CHRONIC BILATERAL LOW BACK PAIN WITH LEFT-SIDED SCIATICA: ICD-10-CM

## 2018-11-13 DIAGNOSIS — Z51.81 ENCOUNTER FOR MONITORING OPIOID MAINTENANCE THERAPY: ICD-10-CM

## 2018-11-13 DIAGNOSIS — G89.29 CHRONIC BILATERAL LOW BACK PAIN WITH BILATERAL SCIATICA: ICD-10-CM

## 2018-11-13 PROCEDURE — 99214 OFFICE O/P EST MOD 30 MIN: CPT | Performed by: NURSE PRACTITIONER

## 2018-11-13 RX ORDER — BUSPIRONE HYDROCHLORIDE 5 MG/1
7.5 TABLET ORAL 2 TIMES DAILY
COMMUNITY

## 2018-11-13 RX ORDER — HYDROCODONE BITARTRATE AND ACETAMINOPHEN 7.5; 325 MG/1; MG/1
1 TABLET ORAL 3 TIMES DAILY
Qty: 90 TABLET | Refills: 0 | Status: SHIPPED | OUTPATIENT
Start: 2018-11-13 | End: 2018-12-06 | Stop reason: SDUPTHER

## 2018-11-13 NOTE — PROGRESS NOTES
"CHIEF COMPLAINT  Pt states LBP has increased with with cold,damp weather but is tolerating overall.    Subjective   Jono Garza is a 43 y.o. male  who presents to the office for follow-up.He has a history of chronic low back pain.    Bilateral L3-5 Lumbar Medial Branch Blockade   on  8-14-18 performed by Dr. Duarte - 90% relief from the procedure the first day and 50% reduction for a month.       A repeat diagnostic lumbar medial branch block was ordered of the previous office visit.  This procedure was denied by insurance stating that \"the injection is not supported if it would exceed the allowed limit of 2 injections to find the source of your pain\".  The patient has completed only 1 diagnostic lumbar medial branch block at this time.  It is necessary and within appropriate clinical guidelines to repeat a second diagnostic lumbar medial branch block with a goal of moving forward with a lumbar radiofrequency lesioning procedure if the second medial branch block is diagnostically positive.      C/o low back pain. Today he reprots that his pain is 5/10 in severity.  He continues with use of hydrocodone 7.5/325 3/day, Gabapentin 600 mg 3/day, and Diclofenac.  Reports moderate reduction in pain with current regimen, denies adverse reactions.      Back Pain   This is a chronic problem. The current episode started more than 1 year ago. The problem occurs constantly. The problem has been waxing and waning since onset. The pain is present in the lumbar spine. The quality of the pain is described as aching and burning. The pain radiates to the left thigh. The pain is at a severity of 6/10. Exacerbated by: activity. Associated symptoms include numbness (L leg) and weakness (L leg). Pertinent negatives include no abdominal pain, bladder incontinence, bowel incontinence, chest pain, dysuria, fever or headaches. Risk factors include lack of exercise and obesity. He has tried NSAIDs and analgesics (injections, " medication) for the symptoms. The treatment provided moderate relief.      Past pain medications: Hydrocodone 5/325 and 7.5/325 QID - not very effective  Hydrocodone 10/325 mg qid - effective - decrease pain level down to a 2-3. Able to be more active, perform more daily activities.   Mobic 15 mg daily - helping      Current pain medications:   hydro/apap 7.5-325 mg TID  diclofenac-misoprostol 50-0.2 mg bid prn - does not take every day.   Gabapentin 600 mg BID - helping  Elavil 50 mg qhs - helping him sleep      Past therapies:  Physical Therapy: yes - last visit was over 1 year ago.   Chiropractor: yes  Massage Therapy: no  TENS: yes - used at PT and chiropractor  Neck or back surgery: no      Previous Injections: bilateral L3-L5 MBB 8/14/18  Effect of Injection (%): 90%  Length of Relief: 24 hours (50% X 1 month)     Previous Injection: Lumbar Epidural Steroid Injection at  L5/S1 on 8/23/17  Effect of Injection (%): 15%  Length of Relief: 3-5 days       Previous Injections: yes, in his lower back at [facility]- LESI x 3 - minimal benefit.   Effect of Injection (%): 0%, he said if it had any affect it did not last long    PEG Assessment   What number best describes your pain on average in the past week?6  What number best describes how, during the past week, pain has interfered with your enjoyment of life?7  What number best describes how, during the past week, pain has interfered with your general activity?  6    The following portions of the patient's history were reviewed and updated as appropriate: allergies, current medications, past family history, past medical history, past social history, past surgical history and problem list.    Review of Systems   Constitutional: Negative for activity change, chills and fever.   Respiratory: Positive for shortness of breath (copd). Negative for cough and wheezing.    Cardiovascular: Negative for chest pain.   Gastrointestinal: Negative for abdominal pain,  "bowel incontinence, constipation, diarrhea and nausea.   Genitourinary: Negative for bladder incontinence, difficulty urinating and dysuria.   Musculoskeletal: Positive for back pain.   Neurological: Positive for weakness (L leg) and numbness (L leg). Negative for dizziness, light-headedness and headaches.   Psychiatric/Behavioral: Positive for sleep disturbance (trazadone for sleep helps). Negative for confusion, hallucinations and suicidal ideas. The patient is nervous/anxious.      Vitals:    11/13/18 1014   BP: 125/82   Pulse: 81   Resp: 16   Temp: 98.4 °F (36.9 °C)   SpO2: 95%   Weight: 112 kg (247 lb)   Height: 175.3 cm (69.02\")   PainSc:   6         Objective   Physical Exam   Constitutional: He is oriented to person, place, and time. He appears well-developed and well-nourished. No distress.   HENT:   Head: Normocephalic and atraumatic.   Nose: Nose normal.   Eyes: Conjunctivae and EOM are normal.   Neck: Normal range of motion. Neck supple.   Cardiovascular: Normal rate.   Pulmonary/Chest: Effort normal. No respiratory distress.   Musculoskeletal:        Lumbar back: He exhibits tenderness and pain.   +lumbar facet tenderness/loading      Neurological: He is alert and oriented to person, place, and time. He has normal strength. Gait normal.   Skin: Skin is warm and dry.   Psychiatric: He has a normal mood and affect. His speech is normal and behavior is normal.   Nursing note and vitals reviewed.    Assessment/Plan   Jono was seen today for back pain.    Diagnoses and all orders for this visit:    Other chronic pain    Chronic bilateral low back pain with left-sided sciatica    DDD (degenerative disc disease), lumbar  -     HYDROcodone-acetaminophen (NORCO) 7.5-325 MG per tablet; Take 1 tablet by mouth 3 (Three) Times a Day.    Lumbar spondylolysis  -     Case Request    Encounter for monitoring opioid maintenance therapy    Chronic bilateral low back pain with bilateral sciatica  -     " "HYDROcodone-acetaminophen (NORCO) 7.5-325 MG per tablet; Take 1 tablet by mouth 3 (Three) Times a Day.      --- Bilateral L3-L5 MBB  -------  Education about Medial Branch Blockade and RF Therapy:    This medial branch blockade (MBB) suggested is intended for diagnostic purposes, with the intent of offering the patient Radiofrequency thermal rhizotomy (RF) if the MBB is diagnostically effective.  The diagnostic blockade is necessary to determine the likelihood that RF therapy could be efficacious in providing long term relief to the patient.    Medial branches are sensory nerve branches that connect to a facet joint and transmit sensations & pain signals from that joint.  Facet is a term for the type of joints found in the spine.  Medial branches are the nerves that go to a facet, and therefore are also sometimes called \"facet joint nerves\" (FJNs).      In a medial branch blockade procedure, xray fluoroscopy is used to verify the locations of the outside of the joint lines which are being targeted.  Under xray guidance, needles are placed to these areas.  Contrast dye is injected to confirm proper placement, with dye flowing over the joint area, and to ensure that the dye does not flow into unintended areas such as a vein.  When this is confirmed, local anesthetic is injected to block the medial branch at that joint level.      If MBBs are diagnostically successful in blocking pain, then the patient is most likely a great candidate for Radiofrequency of those facet joint nerves.  In the RF procedure, needles are placed to the joint lines in the same fashion, and after testing, the needle tips are heated to thermally treat the nerves, blocking the nerves by in essence damaging the nerves with the heat treatment.       Medically, a successful RF procedure should provide a patient with 50% pain relief or more for at least 6 months.  Clinical experience suggests that successful patients receive relief more in the range " of 12 months on average.  We also discussed that a fortunate minority of patients receive therapeutic success from the MBB, and may not require RF ablation.  If a patient receives more than 8 weeks of relief from MBB, then occasional repeat MBB for therapeutic purposes is a very reasonable alternative therapy.  This course of therapy is consistent with our LCDs according to our CMS  in the area, and therefore other insurance providers should follow accordingly.  We will monitor our patients to screen for these therapeutic responders and will offer RF therapy only when necessary.      We discussed that MBB & RF are not without risks.  Guidelines regarding anticoagulant use & neuraxial procedures will be respected.  Patients that are ill or otherwise may be at risk for sepsis will not have their spines accessed by neuraxial injections of any type.  This patient will not be offered these therapies if there is an increased risk.   We discussed that there is a risk of postprocedural pain and also a risk of worsening of clinical picture with these procedures as with any neuraxial procedure.    -------    --- Refill Hydrocodone. Patient appears stable with current regimen. No adverse effects. Regarding continuation of opioids, there is no evidence of aberrant behavior or any red flags.  The patient continues with appropriate response to opioid therapy. ADL's remain intact by self.   --- The urine drug screen confirmation from 7/13/18 has been reviewed and the result is appropriate based on patient history and LUISA report  --- Follow-up 2 months          LUISA REPORT    As part of the patient's treatment plan, I am prescribing controlled substances. The patient has been made aware of appropriate use of such medications, including potential risk of somnolence, limited ability to drive and/or work safely, and the potential for dependence or overdose. It has also bee made clear that these medications are for  use by this patient only, without concomitant use of alcohol or other substances unless prescribed.     Patient has completed prescribing agreement detailing terms of continued prescribing of controlled substances, including monitoring LUISA reports, urine drug screening, and pill counts if necessary. The patient is aware that inappropriate use will results in cessation of prescribing such medications.    LUISA report has been reviewed and scanned into the patient's chart.    As the clinician, I personally reviewed the LUISA from 11/12/2018 while the patient was in the office today.    History and physical exam exhibit continued safe and appropriate use of controlled substances.    EMR Dragon/Transcription disclaimer:   Much of this encounter note is an electronic transcription/translation of spoken language to printed text. The electronic translation of spoken language may permit erroneous, or at times, nonsensical words or phrases to be inadvertently transcribed; Although I have reviewed the note for such errors, some may still exist.

## 2018-11-14 ENCOUNTER — HOSPITAL ENCOUNTER (OUTPATIENT)
Dept: CT IMAGING | Facility: HOSPITAL | Age: 43
Discharge: HOME | End: 2018-11-14
Attending: INTERNAL MEDICINE | Admitting: INTERNAL MEDICINE

## 2018-11-14 DIAGNOSIS — R91.1 PULMONARY NODULE: ICD-10-CM

## 2018-11-14 PROCEDURE — 71250 CT THORAX DX C-: CPT

## 2018-11-21 RX ORDER — BISOPROLOL FUMARATE AND HYDROCHLOROTHIAZIDE 5; 6.25 MG/1; MG/1
TABLET ORAL
Qty: 30 TABLET | Refills: 2 | Status: SHIPPED | OUTPATIENT
Start: 2018-11-21 | End: 2019-02-23 | Stop reason: SDUPTHER

## 2018-11-29 ENCOUNTER — TELEPHONE (OUTPATIENT)
Dept: GASTROENTEROLOGY | Facility: CLINIC | Age: 43
End: 2018-11-29

## 2018-11-29 ENCOUNTER — ANESTHESIA EVENT (OUTPATIENT)
Dept: PERIOP | Facility: HOSPITAL | Age: 43
End: 2018-11-29

## 2018-11-30 ENCOUNTER — ANESTHESIA (OUTPATIENT)
Dept: PERIOP | Facility: HOSPITAL | Age: 43
End: 2018-11-30

## 2018-12-06 DIAGNOSIS — M54.41 CHRONIC BILATERAL LOW BACK PAIN WITH BILATERAL SCIATICA: ICD-10-CM

## 2018-12-06 DIAGNOSIS — M54.42 CHRONIC BILATERAL LOW BACK PAIN WITH BILATERAL SCIATICA: ICD-10-CM

## 2018-12-06 DIAGNOSIS — G89.29 CHRONIC BILATERAL LOW BACK PAIN WITH BILATERAL SCIATICA: ICD-10-CM

## 2018-12-06 DIAGNOSIS — M51.36 DDD (DEGENERATIVE DISC DISEASE), LUMBAR: ICD-10-CM

## 2018-12-06 RX ORDER — HYDROCODONE BITARTRATE AND ACETAMINOPHEN 7.5; 325 MG/1; MG/1
1 TABLET ORAL 3 TIMES DAILY
Qty: 90 TABLET | Refills: 0 | Status: SHIPPED | OUTPATIENT
Start: 2018-12-06 | End: 2019-01-11 | Stop reason: SDUPTHER

## 2018-12-06 NOTE — TELEPHONE ENCOUNTER
Medication Refill Request    Date of phone call: 12/4/18    Medication being requested: Hydrocodone-apap 7.5 sig: 3xday  Qty: 90    Date of last visit: 11/13/18    Date of last refill: 11/13/18    LUISA up to date?: 11/9/18    Next Follow up?: 1/11/18    Any new pertinent information? (i.e, new medication allergies, new use of medications, change in patient's health or condition, non-compliance or inconsistency with prescribing agreement?): n/a

## 2018-12-11 ENCOUNTER — OFFICE VISIT (OUTPATIENT)
Dept: CARDIOLOGY | Facility: CLINIC | Age: 43
End: 2018-12-11

## 2018-12-11 VITALS
HEIGHT: 69 IN | WEIGHT: 249 LBS | BODY MASS INDEX: 36.88 KG/M2 | SYSTOLIC BLOOD PRESSURE: 129 MMHG | DIASTOLIC BLOOD PRESSURE: 77 MMHG | HEART RATE: 73 BPM

## 2018-12-11 DIAGNOSIS — Z72.0 TOBACCO ABUSE: ICD-10-CM

## 2018-12-11 DIAGNOSIS — I47.1 SVT (SUPRAVENTRICULAR TACHYCARDIA) (HCC): Primary | ICD-10-CM

## 2018-12-11 DIAGNOSIS — I10 ESSENTIAL HYPERTENSION: ICD-10-CM

## 2018-12-11 DIAGNOSIS — E66.09 CLASS 1 OBESITY DUE TO EXCESS CALORIES WITH SERIOUS COMORBIDITY AND BODY MASS INDEX (BMI) OF 30.0 TO 30.9 IN ADULT: ICD-10-CM

## 2018-12-11 PROCEDURE — 93000 ELECTROCARDIOGRAM COMPLETE: CPT | Performed by: INTERNAL MEDICINE

## 2018-12-11 PROCEDURE — 99213 OFFICE O/P EST LOW 20 MIN: CPT | Performed by: INTERNAL MEDICINE

## 2018-12-11 NOTE — PROGRESS NOTES
Subjective:        Jono Garza is a 43 y.o. male who here for follow up    CC  The follow-up with obesity hypertension and SVT  HPI  43 years old male with known history of the benign essential arterial hypertension SVT obesity tobacco abuse here for the follow-up with no more episodes of chest pains or tightness in chest no palpitations     Problem List Items Addressed This Visit        Cardiovascular and Mediastinum    Essential hypertension    Relevant Orders    Lipid Panel    Comprehensive Metabolic Panel    SVT (supraventricular tachycardia) (CMS/[facility]) - Primary    Relevant Orders    Lipid Panel    Comprehensive Metabolic Panel       Digestive    Class 1 obesity due to excess calories with serious comorbidity and body mass index (BMI) of 30.0 to 30.9 in adult    Relevant Orders    Lipid Panel    Comprehensive Metabolic Panel       Other    Tobacco abuse    Relevant Orders    Lipid Panel    Comprehensive Metabolic Panel        .    The following portions of the patient's history were reviewed and updated as appropriate: allergies, current medications, past family history, past medical history, past social history, past surgical history and problem list.    Past Medical History:   Diagnosis Date   • Anxiety and depression    • Arthritis    • Back pain    • Bronchiectasis (CMS/[facility])    • COPD (chronic obstructive pulmonary disease) (CMS/[facility]) 2015   • Decreased appetite    • Heartburn    • Hypertension    • Lung nodule    • Migraines    • Numbness of both lower extremities    • Osteoporosis    • Pelvic fracture (CMS/[facility]) 2014    In 6 places   • Pneumonia 07/2017   • Sleep apnea    • SVT (supraventricular tachycardia) (CMS/[facility])      reports that he has been smoking cigarettes.  He started smoking about 29 years ago. He has a 15.00 pack-year smoking history. he has never used smokeless tobacco. He reports that he does not drink alcohol or use drugs.   Family History   Problem Relation Age of Onset   • Other  "Mother          age 50 (accident)   • Skin cancer Mother 38   • Hypertension Mother    • Mental retardation Mother    • Clotting disorder Mother    • Lung cancer Maternal Aunt    • Colon cancer Maternal Uncle    • Lung cancer Maternal Grandfather    • Malig Hyperthermia Neg Hx        Review of Systems  Constitutional: No wt loss, fever, fatigue  Gastrointestinal: No nausea, abdominal pain  Behavioral/Psych: No insomnia or anxiety   Cardiovascular palpitation  Objective:       Physical Exam             Physical Exam  /77   Pulse 73   Ht 175.3 cm (69\")   Wt 113 kg (249 lb)   BMI 36.77 kg/m²     General appearance: NAD, conversant   Eyes: anicteric sclerae, moist conjunctivae; no lid-lag; PERRLA   HENT: Atraumatic; oropharynx clear with moist mucous membranes and no mucosal ulcerations;  normal hard and soft palate   Neck: Trachea midline; FROM, supple, no thyromegaly or lymphadenopathy   Lungs: CTA, with normal respiratory effort and no intercostal retractions   CV: S1-S2 regular, no murmurs, no rub, no gallop   Abdomen: Soft, non-tender; no masses or HSM   Extremities: No peripheral edema or extremity lymphadenopathy  Skin: Normal temperature, turgor and texture; no rash, ulcers or subcutaneous nodules   Psych: Appropriate affect, alert and oriented to person, place and time             ECG 12 Lead  Date/Time: 2018 1:32 PM  Performed by: Annie Kulkarni MD  Authorized by: Annie Kulkarni MD   Comparison: compared with previous ECG   Similar to previous ECG  Rhythm: sinus rhythm  ST Flattening: all  Clinical impression: non-specific ECG              Echocardiogram:        Current Outpatient Medications:   •  albuterol (PROVENTIL HFA;VENTOLIN HFA) 108 (90 Base) MCG/ACT inhaler, Inhale 2 puffs Every 4 (Four) Hours As Needed for Wheezing., Disp: , Rfl:   •  alendronate (FOSAMAX) 5 MG tablet, Take 5 mg by mouth Every Morning Before Breakfast., Disp: , Rfl:   •  amitriptyline (ELAVIL) " 50 MG tablet, Take 75 mg by mouth Every Night., Disp: , Rfl:   •  bisoprolol-hydrochlorothiazide (ZIAC) 5-6.25 MG per tablet, TAKE ONE TABLET BY MOUTH DAILY, Disp: 30 tablet, Rfl: 2  •  busPIRone (BUSPAR) 5 MG tablet, Take 7.5 mg by mouth 2 (Two) Times a Day., Disp: , Rfl:   •  Ergocalciferol (VITAMIN D2 PO), Take 125 mg by mouth 1 (One) Time Per Week., Disp: , Rfl:   •  Fluticasone Furoate-Vilanterol (BREO ELLIPTA) 100-25 MCG/INH aerosol powder , Inhale 1 puff Daily., Disp: , Rfl:   •  gabapentin (NEURONTIN) 600 MG tablet, TAKE ONE TABLET BY MOUTH THREE TIMES A DAY, Disp: 90 tablet, Rfl: 4  •  HYDROcodone-acetaminophen (NORCO) 7.5-325 MG per tablet, Take 1 tablet by mouth 3 (Three) Times a Day., Disp: 90 tablet, Rfl: 0  •  ipratropium-albuterol (DUO-NEB) 0.5-2.5 mg/3 ml nebulizer, Take 3 mL by nebulization Every 4 (Four) Hours As Needed for Wheezing., Disp: , Rfl:   •  Loratadine 10 MG capsule, Take 10 mg by mouth Daily., Disp: , Rfl:   •  pantoprazole (PROTONIX) 40 MG EC tablet, Take 1 tablet by mouth Daily., Disp: 30 tablet, Rfl: 5  •  polyethylene glycol (GoLYTELY) 236 g solution, Drink 1/2 starting at 2:00pm the day prior. Drink remaining 1/2 at 10:00 pm. May add flavor packet., Disp: 4000 mL, Rfl: 0  •  pramipexole (MIRAPEX) 0.125 MG tablet, Take 0.125 mg by mouth 3 (Three) Times a Day., Disp: , Rfl:   •  sertraline (ZOLOFT) 50 MG tablet, Take 50 mg by mouth Daily., Disp: , Rfl:   •  sucralfate (CARAFATE) 1 g tablet, TAKE ONE TABLET BY MOUTH THREE TIMES A DAY CRUSH AND DISSOLVE IN 10MLS OF WATER, Disp: 90 tablet, Rfl: 2  •  traZODone (DESYREL) 50 MG tablet, Take 150 mg by mouth Every Night., Disp: , Rfl:   •  Umeclidinium Bromide (INCRUSE ELLIPTA) 62.5 MCG/INH aerosol powder , Inhale 1 puff Daily., Disp: , Rfl:    Assessment:        Patient Active Problem List   Diagnosis   • Chronic bilateral low back pain with left-sided sciatica   • DDD (degenerative disc disease), lumbar   • Lumbar spondylolysis   •  Tobacco abuse   • Essential hypertension   • SVT (supraventricular tachycardia) (CMS/HCC)   • Eosinophilic leukocytosis   • Encounter for monitoring opioid maintenance therapy   • Left upper quadrant pain   • Gastroesophageal reflux disease   • Pain of upper abdomen               Plan:            ICD-10-CM ICD-9-CM   1. SVT (supraventricular tachycardia) (CMS/HCC) I47.1 427.89   2. Essential hypertension I10 401.9   3. Tobacco abuse Z72.0 305.1   4. Class 1 obesity due to excess calories with serious comorbidity and body mass index (BMI) of 30.0 to 30.9 in adult E66.09 278.00    Z68.30 V85.30     1. SVT (supraventricular tachycardia) (CMS/HCC)  Under control  - Lipid Panel; Future  - Comprehensive Metabolic Panel; Future    2. Essential hypertension  Controlled  - Lipid Panel; Future  - Comprehensive Metabolic Panel; Future    3. Tobacco abuse  Counseling done  - Lipid Panel; Future  - Comprehensive Metabolic Panel; Future    4. Class 1 obesity due to excess calories with serious comorbidity and body mass index (BMI) of 30.0 to 30.9 in adult  Counseling done  - Lipid Panel; Future  - Comprehensive Metabolic Panel; Future       Flp, cmp    See in 2 wks  COUNSELING:    Jono Snider was given to patient for the following topics: diagnostic results, risk factor reductions, impressions, risks and benefits of treatment options and importance of treatment compliance .       SMOKING COUNSELING:    Ready to quit: Yes  Counseling given: Yes      EMR Dragon/Transcription disclaimer:   Much of this encounter note is an electronic transcription/translation of spoken language to printed text. The electronic translation of spoken language may permit erroneous, or at times, nonsensical words or phrases to be inadvertently transcribed; Although I have reviewed the note for such errors, some may still exist.

## 2018-12-14 ENCOUNTER — HOSPITAL ENCOUNTER (OUTPATIENT)
Facility: HOSPITAL | Age: 43
Setting detail: HOSPITAL OUTPATIENT SURGERY
Discharge: HOME | End: 2018-12-14
Attending: INTERNAL MEDICINE | Admitting: INTERNAL MEDICINE

## 2018-12-14 VITALS
OXYGEN SATURATION: 94 % | BODY MASS INDEX: 35.91 KG/M2 | DIASTOLIC BLOOD PRESSURE: 61 MMHG | SYSTOLIC BLOOD PRESSURE: 108 MMHG | WEIGHT: 243.2 LBS | HEART RATE: 78 BPM | RESPIRATION RATE: 18 BRPM | TEMPERATURE: 98.2 F

## 2018-12-14 DIAGNOSIS — R10.12 LEFT UPPER QUADRANT PAIN: ICD-10-CM

## 2018-12-14 PROCEDURE — 25010000002 PROPOFOL 10 MG/ML EMULSION: Performed by: NURSE ANESTHETIST, CERTIFIED REGISTERED

## 2018-12-14 PROCEDURE — 94640 AIRWAY INHALATION TREATMENT: CPT

## 2018-12-14 PROCEDURE — 45380 COLONOSCOPY AND BIOPSY: CPT | Performed by: INTERNAL MEDICINE

## 2018-12-14 PROCEDURE — 88305 TISSUE EXAM BY PATHOLOGIST: CPT | Performed by: INTERNAL MEDICINE

## 2018-12-14 RX ORDER — KETAMINE HYDROCHLORIDE 100 MG/ML
INJECTION INTRAMUSCULAR; INTRAVENOUS AS NEEDED
Status: DISCONTINUED | OUTPATIENT
Start: 2018-12-14 | End: 2018-12-14 | Stop reason: SURG

## 2018-12-14 RX ORDER — PROPOFOL 10 MG/ML
VIAL (ML) INTRAVENOUS AS NEEDED
Status: DISCONTINUED | OUTPATIENT
Start: 2018-12-14 | End: 2018-12-14 | Stop reason: SURG

## 2018-12-14 RX ORDER — SODIUM CHLORIDE 0.9 % (FLUSH) 0.9 %
1-10 SYRINGE (ML) INJECTION AS NEEDED
Status: DISCONTINUED | OUTPATIENT
Start: 2018-12-14 | End: 2018-12-14 | Stop reason: HOSPADM

## 2018-12-14 RX ORDER — SODIUM CHLORIDE, SODIUM LACTATE, POTASSIUM CHLORIDE, CALCIUM CHLORIDE 600; 310; 30; 20 MG/100ML; MG/100ML; MG/100ML; MG/100ML
100 INJECTION, SOLUTION INTRAVENOUS CONTINUOUS
Status: CANCELLED | OUTPATIENT
Start: 2018-12-14

## 2018-12-14 RX ORDER — SODIUM CHLORIDE 0.9 % (FLUSH) 0.9 %
3 SYRINGE (ML) INJECTION EVERY 12 HOURS SCHEDULED
Status: DISCONTINUED | OUTPATIENT
Start: 2018-12-14 | End: 2018-12-14 | Stop reason: HOSPADM

## 2018-12-14 RX ORDER — LIDOCAINE HYDROCHLORIDE 20 MG/ML
INJECTION, SOLUTION INFILTRATION; PERINEURAL AS NEEDED
Status: DISCONTINUED | OUTPATIENT
Start: 2018-12-14 | End: 2018-12-14 | Stop reason: SURG

## 2018-12-14 RX ORDER — IPRATROPIUM BROMIDE AND ALBUTEROL SULFATE 2.5; .5 MG/3ML; MG/3ML
3 SOLUTION RESPIRATORY (INHALATION) ONCE
Status: COMPLETED | OUTPATIENT
Start: 2018-12-14 | End: 2018-12-14

## 2018-12-14 RX ORDER — SODIUM CHLORIDE 9 MG/ML
40 INJECTION, SOLUTION INTRAVENOUS AS NEEDED
Status: DISCONTINUED | OUTPATIENT
Start: 2018-12-14 | End: 2018-12-14 | Stop reason: HOSPADM

## 2018-12-14 RX ORDER — SODIUM CHLORIDE, SODIUM LACTATE, POTASSIUM CHLORIDE, CALCIUM CHLORIDE 600; 310; 30; 20 MG/100ML; MG/100ML; MG/100ML; MG/100ML
9 INJECTION, SOLUTION INTRAVENOUS CONTINUOUS
Status: DISCONTINUED | OUTPATIENT
Start: 2018-12-14 | End: 2018-12-14 | Stop reason: HOSPADM

## 2018-12-14 RX ORDER — LIDOCAINE HYDROCHLORIDE 10 MG/ML
0.5 INJECTION, SOLUTION EPIDURAL; INFILTRATION; INTRACAUDAL; PERINEURAL ONCE AS NEEDED
Status: COMPLETED | OUTPATIENT
Start: 2018-12-14 | End: 2018-12-14

## 2018-12-14 RX ORDER — ONDANSETRON 2 MG/ML
4 INJECTION INTRAMUSCULAR; INTRAVENOUS ONCE AS NEEDED
Status: CANCELLED | OUTPATIENT
Start: 2018-12-14

## 2018-12-14 RX ADMIN — SODIUM CHLORIDE, POTASSIUM CHLORIDE, SODIUM LACTATE AND CALCIUM CHLORIDE: 600; 310; 30; 20 INJECTION, SOLUTION INTRAVENOUS at 14:43

## 2018-12-14 RX ADMIN — PROPOFOL 50 MG: 10 INJECTION, EMULSION INTRAVENOUS at 15:08

## 2018-12-14 RX ADMIN — PROPOFOL 50 MG: 10 INJECTION, EMULSION INTRAVENOUS at 15:03

## 2018-12-14 RX ADMIN — PROPOFOL 50 MG: 10 INJECTION, EMULSION INTRAVENOUS at 14:59

## 2018-12-14 RX ADMIN — IPRATROPIUM BROMIDE AND ALBUTEROL SULFATE 3 ML: .5; 3 SOLUTION RESPIRATORY (INHALATION) at 14:28

## 2018-12-14 RX ADMIN — SODIUM CHLORIDE, POTASSIUM CHLORIDE, SODIUM LACTATE AND CALCIUM CHLORIDE 9 ML/HR: 600; 310; 30; 20 INJECTION, SOLUTION INTRAVENOUS at 14:00

## 2018-12-14 RX ADMIN — KETAMINE HYDROCHLORIDE 20 MG: 100 INJECTION INTRAMUSCULAR; INTRAVENOUS at 14:55

## 2018-12-14 RX ADMIN — LIDOCAINE HYDROCHLORIDE 100 MG: 20 INJECTION, SOLUTION INFILTRATION; PERINEURAL at 14:46

## 2018-12-14 RX ADMIN — PROPOFOL 50 MG: 10 INJECTION, EMULSION INTRAVENOUS at 14:52

## 2018-12-14 RX ADMIN — PROPOFOL 50 MG: 10 INJECTION, EMULSION INTRAVENOUS at 14:55

## 2018-12-14 RX ADMIN — PROPOFOL 100 MG: 10 INJECTION, EMULSION INTRAVENOUS at 14:46

## 2018-12-14 RX ADMIN — LIDOCAINE HYDROCHLORIDE 0.5 ML: 10 INJECTION, SOLUTION EPIDURAL; INFILTRATION; INTRACAUDAL; PERINEURAL at 14:00

## 2018-12-14 NOTE — ANESTHESIA POSTPROCEDURE EVALUATION
Patient: Jono Garza    Procedure Summary     Date:  12/14/18 Room / Location:  [facility] ENDOSCOPY 2 /  [facility] OR    Anesthesia Start:  1443 Anesthesia Stop:  1524    Procedure:  COLONOSCOPY WITH POLYPECTOMY (N/A ) Diagnosis:       Left upper quadrant pain      (Left upper quadrant pain [R10.12])    Surgeon:  Della Crook MD Provider:  Nicol Roberto CRNA    Anesthesia Type:  MAC ASA Status:  3          Anesthesia Type: MAC  Last vitals  BP   116/73 (12/14/18 1535)   Temp   98.2 °F (36.8 °C) (12/14/18 1402)   Pulse   76 (12/14/18 1535)   Resp   14 (12/14/18 1535)     SpO2   93 % (12/14/18 1535)     Post Anesthesia Care and Evaluation    Patient location during evaluation: bedside  Patient participation: complete - patient participated  Level of consciousness: awake and alert  Pain score: 0  Pain management: adequate  Airway patency: patent  Anesthetic complications: No anesthetic complications  PONV Status: none  Cardiovascular status: acceptable  Respiratory status: acceptable  Hydration status: acceptable
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
## 2018-12-14 NOTE — OP NOTE
Patient Name:  Jono Garza  YOB: 1975    Date of Procedure:  12/14/2018    Procedure Performed: Colonoscopy   Indications:  Abdominal pain    Pre-op Diagnosis:   Left upper quadrant pain [R10.12]    Post-Op Diagnosis Codes:     * Left upper quadrant pain [R10.12]         Staff:  Surgeon(s):  Della Crook MD         Consent: Procedure colonoscopy Indications, risks and procedure were discussed with the patient, including but not limited to, bleeding, infection, possibility of perforation and possible polypectomy. All of the patient's questions were answered, and signed informed consent was obtained and placed on the chart.      Sedation: Sedation was provided by anesthesia.      Estimated Blood Loss: minimal    Specimens:   ID Type Source Tests Collected by Time   A : Transverse Colon Polyp x1 Polyp Large Intestine, Transverse Colon TISSUE PATHOLOGY EXAM Della Crook MD 12/14/2018 1510         Description of Procedure:   After excellent sedation a digital rectal examination is performed and a flexible colonoscope was inserted into the rectum passed to the cecum.  The cecum was identified by both the ileocecal valve and the appendiceal orifice.  The overall bowel preparation was fair.  Hit copious amounts of dark stool noted throughout the colon much time was taken wash and suction out these areas as much as possible.  There were areas of solid stool material noted.  Upon withdrawal the scope careful examination mucosa was made.  Pertinent findings include a 3 mm sessile transverse polyp removed using forceps.  The scope was slowly withdrawn to the rectum and retroflex were internal hemorrhoids are noted.  The scope was then straightened and withdrawn out of the patient with no immediate, patient's and he tolerated the procedure well.      Findings:   Colon polyp removed with forceps  Internal hemorrhoids  We'll await biopsy results.  A repeat colonoscopy will likely be recommended  for 5 years.    Complications:   none        Della Crook MD     Date: 12/14/2018  Time: 3:26 PM

## 2018-12-14 NOTE — ANESTHESIA PREPROCEDURE EVALUATION
Anesthesia Evaluation     Patient summary reviewed and Nursing notes reviewed   no history of anesthetic complications:  NPO Solid Status: > 8 hours  NPO Liquid Status: > 8 hours           Airway   Mallampati: II  TM distance: >3 FB  Neck ROM: full  No difficulty expected  Dental    (+) poor dentition    Pulmonary    (+) pneumonia , a smoker (1/2 pp, 34 pk yr hx) Current Smoked day of surgery, COPD moderate, shortness of breath, sleep apnea (non compliant w/ CPAP), wheezes,     ROS comment: Room air Sao2 92 -3  Cardiovascular - normal exam  Exercise tolerance: poor (<4 METS)    ECG reviewed  Rhythm: regular  Rate: normal    (+) hypertension well controlled, dysrhythmias (SVT), HATCH,       Neuro/Psych  (+) headaches, numbness (bilat legs), psychiatric history Anxiety and Depression,     GI/Hepatic/Renal/Endo    (+) obesity,  GERD well controlled,      Musculoskeletal     (+) back pain, chronic pain,   Abdominal   (+) obese,    Substance History - negative use     OB/GYN          Other   (+) arthritis                     Anesthesia Plan    ASA 3     MAC     Anesthetic plan, all risks, benefits, and alternatives have been provided, discussed and informed consent has been obtained with: patient.  Use of blood products discussed with patient  Consented to blood products.

## 2018-12-17 ENCOUNTER — RESULTS ENCOUNTER (OUTPATIENT)
Dept: CARDIOLOGY | Facility: CLINIC | Age: 43
End: 2018-12-17

## 2018-12-17 DIAGNOSIS — I10 ESSENTIAL HYPERTENSION: ICD-10-CM

## 2018-12-17 DIAGNOSIS — Z72.0 TOBACCO ABUSE: ICD-10-CM

## 2018-12-17 DIAGNOSIS — I47.1 SVT (SUPRAVENTRICULAR TACHYCARDIA) (HCC): ICD-10-CM

## 2018-12-17 DIAGNOSIS — E66.09 CLASS 1 OBESITY DUE TO EXCESS CALORIES WITH SERIOUS COMORBIDITY AND BODY MASS INDEX (BMI) OF 30.0 TO 30.9 IN ADULT: ICD-10-CM

## 2018-12-18 LAB
CYTO UR: NORMAL
LAB AP CASE REPORT: NORMAL
PATH REPORT.FINAL DX SPEC: NORMAL
PATH REPORT.GROSS SPEC: NORMAL

## 2018-12-21 ENCOUNTER — TRANSCRIBE ORDERS (OUTPATIENT)
Dept: ADMINISTRATIVE | Facility: HOSPITAL | Age: 43
End: 2018-12-21

## 2018-12-21 DIAGNOSIS — R91.8 LUNG NODULES: Primary | ICD-10-CM

## 2018-12-28 ENCOUNTER — APPOINTMENT (OUTPATIENT)
Dept: CT IMAGING | Facility: HOSPITAL | Age: 43
End: 2018-12-28
Attending: INTERNAL MEDICINE

## 2019-01-06 DIAGNOSIS — M51.36 DDD (DEGENERATIVE DISC DISEASE), LUMBAR: ICD-10-CM

## 2019-01-06 DIAGNOSIS — M54.42 CHRONIC BILATERAL LOW BACK PAIN WITH LEFT-SIDED SCIATICA: ICD-10-CM

## 2019-01-06 DIAGNOSIS — G89.29 CHRONIC BILATERAL LOW BACK PAIN WITH LEFT-SIDED SCIATICA: ICD-10-CM

## 2019-01-06 DIAGNOSIS — K31.89 STOMACH IRRITATION: ICD-10-CM

## 2019-01-08 RX ORDER — DICLOFENAC SODIUM AND MISOPROSTOL 50; 200 MG/1; UG/1
TABLET, DELAYED RELEASE ORAL
Qty: 60 TABLET | Refills: 2 | OUTPATIENT
Start: 2019-01-08

## 2019-01-11 ENCOUNTER — OFFICE VISIT (OUTPATIENT)
Dept: PAIN MEDICINE | Facility: CLINIC | Age: 44
End: 2019-01-11

## 2019-01-11 ENCOUNTER — RESULTS ENCOUNTER (OUTPATIENT)
Dept: PAIN MEDICINE | Facility: CLINIC | Age: 44
End: 2019-01-11

## 2019-01-11 VITALS
HEIGHT: 69 IN | TEMPERATURE: 97.9 F | DIASTOLIC BLOOD PRESSURE: 79 MMHG | HEART RATE: 80 BPM | WEIGHT: 246 LBS | SYSTOLIC BLOOD PRESSURE: 122 MMHG | BODY MASS INDEX: 36.43 KG/M2 | RESPIRATION RATE: 16 BRPM | OXYGEN SATURATION: 95 %

## 2019-01-11 DIAGNOSIS — M51.36 DDD (DEGENERATIVE DISC DISEASE), LUMBAR: ICD-10-CM

## 2019-01-11 DIAGNOSIS — G89.29 CHRONIC BILATERAL LOW BACK PAIN WITH LEFT-SIDED SCIATICA: ICD-10-CM

## 2019-01-11 DIAGNOSIS — Z79.891 ENCOUNTER FOR MONITORING OPIOID MAINTENANCE THERAPY: ICD-10-CM

## 2019-01-11 DIAGNOSIS — M54.41 CHRONIC BILATERAL LOW BACK PAIN WITH BILATERAL SCIATICA: ICD-10-CM

## 2019-01-11 DIAGNOSIS — M43.06 LUMBAR SPONDYLOLYSIS: ICD-10-CM

## 2019-01-11 DIAGNOSIS — G89.29 OTHER CHRONIC PAIN: ICD-10-CM

## 2019-01-11 DIAGNOSIS — G89.29 CHRONIC BILATERAL LOW BACK PAIN WITH BILATERAL SCIATICA: ICD-10-CM

## 2019-01-11 DIAGNOSIS — Z51.81 ENCOUNTER FOR MONITORING OPIOID MAINTENANCE THERAPY: ICD-10-CM

## 2019-01-11 DIAGNOSIS — M54.42 CHRONIC BILATERAL LOW BACK PAIN WITH LEFT-SIDED SCIATICA: ICD-10-CM

## 2019-01-11 DIAGNOSIS — G89.29 OTHER CHRONIC PAIN: Primary | ICD-10-CM

## 2019-01-11 DIAGNOSIS — M54.42 CHRONIC BILATERAL LOW BACK PAIN WITH BILATERAL SCIATICA: ICD-10-CM

## 2019-01-11 LAB
POC AMPHETAMINES: NEGATIVE
POC BARBITURATES: NEGATIVE
POC BENZODIAZEPHINES: NEGATIVE
POC COCAINE: NEGATIVE
POC METHADONE: NEGATIVE
POC METHAMPHETAMINE SCREEN URINE: NEGATIVE
POC OPIATES: NEGATIVE
POC OXYCODONE: NEGATIVE
POC PHENCYCLIDINE: NEGATIVE
POC PROPOXYPHENE: NEGATIVE
POC THC: NEGATIVE
POC TRICYCLIC ANTIDEPRESSANTS: POSITIVE

## 2019-01-11 PROCEDURE — 99214 OFFICE O/P EST MOD 30 MIN: CPT | Performed by: NURSE PRACTITIONER

## 2019-01-11 PROCEDURE — 80305 DRUG TEST PRSMV DIR OPT OBS: CPT | Performed by: NURSE PRACTITIONER

## 2019-01-11 RX ORDER — HYDROCODONE BITARTRATE AND ACETAMINOPHEN 7.5; 325 MG/1; MG/1
1 TABLET ORAL 3 TIMES DAILY
Qty: 90 TABLET | Refills: 0 | Status: SHIPPED | OUTPATIENT
Start: 2019-01-11 | End: 2019-02-05 | Stop reason: SDUPTHER

## 2019-01-11 NOTE — PROGRESS NOTES
"CHIEF COMPLAINT  Since 11/13/18 office visit, Pt states his bilateral LBP has increased \"with the cold weather\".  Initial Evaluation by Gregoria ANGULO--- KW PATIENT  Subjective   Jono Garza is a 43 y.o. male  who presents to the office for follow-up.He has a history of chronic back pain. REports his pain is worse since last office visit, but he associates this with cold weather.    Complains of pain in his back and abdomen. Today his pain is 7/10VAS. Describes the pain as continuous aching and throbbing. COntinues with Hydrocodone 7.5/325 3/day and Gabapentin 600 mg 3/day. Diclofenac was discontinued by Dr. Crook. Denies any side effects from the regimen. REports this helps decrease his pain by 40%. ADL's by self. Denies any bowel or bladder incontinence.    Did ask about increasing Hydrocodone to 4/day \"during this flare.\"     Bilateral L3-5 Lumbar Medial Branch Blockade   on  8-14-18 performed by Dr. Duarte - 90% relief from the procedure the first day and 50% reduction for a month.  Back Pain   This is a chronic problem. The current episode started more than 1 year ago. The problem occurs constantly. Progression since onset: worse since last office visit. The pain is present in the lumbar spine. The pain is at a severity of 7/10. The pain is moderate. The pain is worse during the day. The symptoms are aggravated by bending, position, standing and twisting. Associated symptoms include abdominal pain (L sided-GRD done at [facility] per Dr Crook), numbness (L leg) and weakness (L leg). Pertinent negatives include no bladder incontinence, bowel incontinence, chest pain, dysuria, fever or headaches. Risk factors include lack of exercise, obesity and sedentary lifestyle. He has tried analgesics (gabapentin, LMBB) for the symptoms. The treatment provided moderate relief.      Past pain medications: Hydrocodone 5/325 and 7.5/325 QID - not very effective  Hydrocodone 10/325 mg qid - effective - decrease pain " level down to a 2-3. Able to be more active, perform more daily activities.   Mobic 15 mg daily - helping      Current pain medications:   hydro/apap 7.5-325 mg TID  diclofenac-misoprostol 50-0.2 mg bid prn - does not take every day.   Gabapentin 600 mg BID - helping  Elavil 50 mg qhs - helping him sleep      Past therapies:  Physical Therapy: yes - last visit was over 1 year ago.   Chiropractor: yes  Massage Therapy: no  TENS: yes - used at PT and chiropractor  Neck or back surgery: no      Previous Injections: bilateral L3-L5 MBB 8/14/18  Effect of Injection (%): 90%  Length of Relief: 24 hours (50% X 1 month)     Previous Injection: Lumbar Epidural Steroid Injection at  L5/S1 on 8/23/17  Effect of Injection (%): 15%  Length of Relief: 3-5 days       Previous Injections: yes, in his lower back at [facility]- [facility]ESI x 3 - minimal benefit.   Effect of Injection (%): 0%, he said if it had any affect it did not last long    PEG Assessment   What number best describes your pain on average in the past week?7  What number best describes how, during the past week, pain has interfered with your enjoyment of life?7  What number best describes how, during the past week, pain has interfered with your general activity?  7      The following portions of the patient's history were reviewed and updated as appropriate: allergies, current medications, past family history, past medical history, past social history, past surgical history and problem list.    Review of Systems   Constitutional: Positive for activity change (decreased). Negative for chills and fever.   Respiratory: Positive for shortness of breath (copd). Negative for cough and wheezing.    Cardiovascular: Negative for chest pain.   Gastrointestinal: Positive for abdominal pain (L sided-GRD done at [facility] per Dr Crook). Negative for bowel incontinence, constipation, diarrhea and nausea.   Genitourinary: Negative for bladder incontinence, difficulty urinating  "and dysuria.   Musculoskeletal: Positive for back pain.   Neurological: Positive for weakness (L leg) and numbness (L leg). Negative for dizziness, light-headedness and headaches.   Psychiatric/Behavioral: Positive for sleep disturbance (trazadone for sleep helps). Negative for confusion, hallucinations and suicidal ideas. The patient is nervous/anxious.        Vitals:    01/11/19 0935   BP: 122/79   Pulse: 80   Resp: 16   Temp: 97.9 °F (36.6 °C)   SpO2: 95%   Weight: 112 kg (246 lb)   Height: 175.3 cm (69.02\")   PainSc: 7  Comment: LBP bilaterally ranges from 4-8/10     Objective   Physical Exam   Constitutional: He is oriented to person, place, and time. Vital signs are normal. He appears well-developed and well-nourished. He is cooperative.   HENT:   Head: Normocephalic and atraumatic.   Nose: Nose normal.   Eyes: Conjunctivae and lids are normal.   Cardiovascular: Normal rate.   Pulmonary/Chest: Effort normal.   Abdominal: Normal appearance.   Musculoskeletal:        Lumbar back: He exhibits decreased range of motion, bony tenderness (bilateral L1-L4 facet tenderness moderate; + loading manuever) and pain.   Neurological: He is alert and oriented to person, place, and time. He has normal strength and normal reflexes. No cranial nerve deficit. Coordination and gait normal.   Reflex Scores:       Patellar reflexes are 2+ on the right side and 2+ on the left side.  Skin: Skin is warm, dry and intact.   Psychiatric: He has a normal mood and affect. His speech is normal and behavior is normal. Judgment and thought content normal. Cognition and memory are normal.   Nursing note and vitals reviewed.      Assessment/Plan   Jono was seen today for back pain.    Diagnoses and all orders for this visit:    Other chronic pain    Chronic bilateral low back pain with left-sided sciatica    DDD (degenerative disc disease), lumbar  -     HYDROcodone-acetaminophen (NORCO) 7.5-325 MG per tablet; Take 1 tablet by mouth 3 " (Three) Times a Day.    Lumbar spondylolysis    Encounter for monitoring opioid maintenance therapy    Chronic bilateral low back pain with bilateral sciatica  -     HYDROcodone-acetaminophen (NORCO) 7.5-325 MG per tablet; Take 1 tablet by mouth 3 (Three) Times a Day.      --- Routine UDS in office today as part of monitoring requirements for controlled substances.  The specimen was viewed and the immunoassay result reviewed and is +TCA.  This specimen will be sent to [facility] for confirmation.   Last dose of Hydrocodone was this morning per patient report.  --- refill hydrocodone. DNF until 1-13-19. Patient appears stable with current regimen. No adverse effects. Regarding continuation of opioids, there is no evidence of aberrant behavior or any red flags.  The patient continues with appropriate response to opioid therapy. ADL's remain intact by self. REVIEWED NO INCREASE AT THIS TIME, PROCEED WITH PROCEDURES FOR BACK PAIN INCREASE.  --- Proceed with lumbar MBB as approved.  --- Follow up with Dr. Crook as scheduled.  --- Follow-up 2 months or sooner if needed.       LUISA REPORT    As part of the patient's treatment plan, I am prescribing controlled substances. The patient has been made aware of appropriate use of such medications, including potential risk of somnolence, limited ability to drive and/or work safely, and the potential for dependence or overdose. It has also bee made clear that these medications are for use by this patient only, without concomitant use of alcohol or other substances unless prescribed.     Patient has completed prescribing agreement detailing terms of continued prescribing of controlled substances, including monitoring LUISA reports, urine drug screening, and pill counts if necessary. The patient is aware that inappropriate use will results in cessation of prescribing such medications.    LUISA report has been reviewed and scanned into the patient's chart.    As the  clinician, I personally reviewed the LUISA from 1-9-19 while the patient was in the office today.    History and physical exam exhibit continued safe and appropriate use of controlled substances.      EMR Dragon/Transcription disclaimer:   Much of this encounter note is an electronic transcription/translation of spoken language to printed text. The electronic translation of spoken language may permit erroneous, or at times, nonsensical words or phrases to be inadvertently transcribed; Although I have reviewed the note for such errors, some may still exist.

## 2019-01-22 RX ORDER — SUCRALFATE 1 G/1
TABLET ORAL
Qty: 90 TABLET | Refills: 1 | Status: SHIPPED | OUTPATIENT
Start: 2019-01-22 | End: 2019-03-26 | Stop reason: SDUPTHER

## 2019-01-23 ENCOUNTER — DOCUMENTATION (OUTPATIENT)
Dept: PAIN MEDICINE | Facility: CLINIC | Age: 44
End: 2019-01-23

## 2019-01-23 ENCOUNTER — OUTSIDE FACILITY SERVICE (OUTPATIENT)
Dept: PAIN MEDICINE | Facility: CLINIC | Age: 44
End: 2019-01-23

## 2019-01-23 PROCEDURE — 64493 INJ PARAVERT F JNT L/S 1 LEV: CPT | Performed by: PAIN MEDICINE

## 2019-01-23 PROCEDURE — 64494 INJ PARAVERT F JNT L/S 2 LEV: CPT | Performed by: PAIN MEDICINE

## 2019-01-23 NOTE — PROGRESS NOTES
Bilateral L3-5 Lumbar Medial Branch Blockade  [facility]      PREOPERATIVE DIAGNOSIS:  Lumbar spondylosis without myelopathy    POSTOPERATIVE DIAGNOSIS:  Lumbar spondylosis without myelopathy    PROCEDURE:   Diagnostic Bilateral Lumbar Medial Branch Nerve Blockades, with fluoroscopy:  L3, L4, and L5 nerves (at the L4 & L5 transverse processes and the sacral alar groove) to block facet joints L4-5, and L5-S1  1. 13783-62 -- Bilateral Lumbar Facet blocks, 1st Level  2. 19674-78 -- Bilateral Lumbar Facet blocks, 2nd  Level    PRE-PROCEDURE DISCUSSION WITH PATIENT:    Risks and complications were discussed with the patient prior to starting the procedure and informed consent was obtained.      SURGEON:  Chante Duarte MD    REASON FOR PROCEDURE:    The patient complains of pain that seems to have a significant axial component, The patient admits to 80% or more pain relief diagnostically from medial branch blockades. and Positive lumbar facet loading maneuver    SEDATION:  Versed 4mg IV  ANESTHETIC:  Marcaine 0.25%  STEROID:  Methylprednisolone (DEPO MEDROL) 80mg/ml  TOTAL VOLUME OF SOLUTION:  6ml    DESCRIPTON OF PROCEDURE:  After obtaining informed consent, IV access was obtained in the preoperative area.   The patient was taken to the operating room.  The patient was placed in the prone position with a pillow under the abdomen. All pressure points were well padded.  EKG, blood pressure, and pulse oximeter were monitored.  The patient was monitored and sedated by the RN under my direction. The lumbosacral area was prepped with Chloraprep and draped in a sterile fashion. Under fluoroscopic guidance the transverse processes of the L4 and L5 vertebrae at the junctions of the superior articular processes were identified on the right. Also identified was the groove between the ala and the superior articular process of the sacrum on the ipsilateral side.  Skin and subcutaneous tissue were anesthetized  with 1% lidocaine above each of these points. A 22-gauge spinal needle was introduced under fluoroscopic guidance at the above junctions. Aspiration was negative for blood and CSF.  After confirming the position of the needle with fluoroscope in all views, 1 mL of the anesthetic solution noted above was injected at each of these points.  Needles were removed intact from each of the areas.  A similar procedure was repeated to block the L3, L4, and L5 nerves on the contralateral side.   Onset of analgesia was noted.  Vital signs remained stable throughout.      ESTIMATED BLOOD LOSS:  <5 mL  SPECIMENS:  none    COMPLICATIONS:   No complications were noted.    TOLERANCE & DISCHARGE CONDITION:    The patient tolerated the procedure well.  The patient was transported to the recovery area without difficulties.  The patient was discharged to home under the care of family in stable and satisfactory condition.    PLAN OF CARE:  1. The patient was given our standard instruction sheet.  2. We discussed that Lumbar Medial Branch Blockade is a diagnostic procedure in consideration for radiofrequency ablation if two diagnostic procedures prove to be positive for significant benefit.  If sustained relief of 6 to eight weeks is obtained, then an alternative plan could be therapeutic lumbar branch blockades.  3. The patient is asked to keep a pain log each hour for 8 hours after the procedure today.  4. The patient will  Return to clinic 4 wks.  5. The patient will resume all medications as per the medication reconciliation sheet.

## 2019-02-05 DIAGNOSIS — M54.42 CHRONIC BILATERAL LOW BACK PAIN WITH BILATERAL SCIATICA: ICD-10-CM

## 2019-02-05 DIAGNOSIS — M51.36 DDD (DEGENERATIVE DISC DISEASE), LUMBAR: ICD-10-CM

## 2019-02-05 DIAGNOSIS — M54.41 CHRONIC BILATERAL LOW BACK PAIN WITH BILATERAL SCIATICA: ICD-10-CM

## 2019-02-05 DIAGNOSIS — G89.29 CHRONIC BILATERAL LOW BACK PAIN WITH BILATERAL SCIATICA: ICD-10-CM

## 2019-02-05 RX ORDER — HYDROCODONE BITARTRATE AND ACETAMINOPHEN 7.5; 325 MG/1; MG/1
1 TABLET ORAL 3 TIMES DAILY
Qty: 90 TABLET | Refills: 0 | Status: SHIPPED | OUTPATIENT
Start: 2019-02-05 | End: 2019-03-11 | Stop reason: SDUPTHER

## 2019-02-25 RX ORDER — BISOPROLOL FUMARATE AND HYDROCHLOROTHIAZIDE 5; 6.25 MG/1; MG/1
TABLET ORAL
Qty: 30 TABLET | Refills: 1 | Status: SHIPPED | OUTPATIENT
Start: 2019-02-25 | End: 2019-04-25 | Stop reason: SDUPTHER

## 2019-03-11 ENCOUNTER — OFFICE VISIT (OUTPATIENT)
Dept: PAIN MEDICINE | Facility: CLINIC | Age: 44
End: 2019-03-11

## 2019-03-11 VITALS
HEIGHT: 69 IN | DIASTOLIC BLOOD PRESSURE: 80 MMHG | OXYGEN SATURATION: 96 % | HEART RATE: 102 BPM | WEIGHT: 246 LBS | RESPIRATION RATE: 14 BRPM | SYSTOLIC BLOOD PRESSURE: 143 MMHG | BODY MASS INDEX: 36.43 KG/M2 | TEMPERATURE: 97.8 F

## 2019-03-11 DIAGNOSIS — M51.36 DDD (DEGENERATIVE DISC DISEASE), LUMBAR: ICD-10-CM

## 2019-03-11 DIAGNOSIS — G89.29 OTHER CHRONIC PAIN: Primary | ICD-10-CM

## 2019-03-11 DIAGNOSIS — M54.42 CHRONIC BILATERAL LOW BACK PAIN WITH BILATERAL SCIATICA: ICD-10-CM

## 2019-03-11 DIAGNOSIS — M43.06 LUMBAR SPONDYLOLYSIS: ICD-10-CM

## 2019-03-11 DIAGNOSIS — M54.41 CHRONIC BILATERAL LOW BACK PAIN WITH BILATERAL SCIATICA: ICD-10-CM

## 2019-03-11 DIAGNOSIS — G89.29 CHRONIC BILATERAL LOW BACK PAIN WITH BILATERAL SCIATICA: ICD-10-CM

## 2019-03-11 DIAGNOSIS — Z51.81 ENCOUNTER FOR MONITORING OPIOID MAINTENANCE THERAPY: ICD-10-CM

## 2019-03-11 DIAGNOSIS — Z79.891 ENCOUNTER FOR MONITORING OPIOID MAINTENANCE THERAPY: ICD-10-CM

## 2019-03-11 PROCEDURE — 99214 OFFICE O/P EST MOD 30 MIN: CPT | Performed by: NURSE PRACTITIONER

## 2019-03-11 RX ORDER — HYDROCODONE BITARTRATE AND ACETAMINOPHEN 7.5; 325 MG/1; MG/1
1 TABLET ORAL 3 TIMES DAILY
Qty: 90 TABLET | Refills: 0 | Status: SHIPPED | OUTPATIENT
Start: 2019-03-11 | End: 2019-04-08 | Stop reason: SDUPTHER

## 2019-03-11 NOTE — PROGRESS NOTES
CHIEF COMPLAINT  F/U Back Pain. Patient states that back pain is 5 out of 10. Some days the pain is tolerable with medication but other days it is not. Patient is c/o right knee pain at 6/10 for about 2 weeks. He states he fell while walking his dog about 2 weeks ago.     Subjective   Jono Garza is a 43 y.o. male  who presents to the office for follow-up.He has a history of chronic back pain. Reports this is unchanged since last office visit.  He had a bilateral L3-L5 MBB performed by Dr. Duarte on 1-23-19.  He reports He had 75-80% relief for 1 week. Then he returned to pre-procedure pain level after 1 week.  He has a new complaint of right knee pain. He reports this occurred when he fell two weeks ago while walking his dog. He also reports he hit his back on a concrete step with the fall.    Complains of pain in his low back and right knee. Today his low back pain is 4-5/10VAs. His right knee pain is 6/10VAS.  Describes his pain as continuous aching and throbbing. Continues with Hydrocodone 7.5/325 3/day and Gabapentin 600 mg 3/day.  Denies any side effects from the regimen. Reports this helps decrease his pain by 40%. ADL's by self. Denies any bowel or bladder incontinence. Had to be discontinued on Diclofenac due to GI.    Back Pain   This is a chronic problem. The current episode started more than 1 year ago. The problem occurs constantly. Progression since onset: unchanged since last office visit. The pain is present in the lumbar spine. The pain is at a severity of 5/10. The pain is moderate. The pain is worse during the day. The symptoms are aggravated by bending, position, standing and twisting. Associated symptoms include abdominal pain, numbness (left thigh) and weakness. Pertinent negatives include no bladder incontinence, bowel incontinence, chest pain, dysuria, fever or headaches. Risk factors include lack of exercise, obesity and sedentary lifestyle. He has tried analgesics (gabapentin,  LEXUS) for the symptoms. The treatment provided moderate relief.      Past pain medications: Hydrocodone 5/325 and 7.5/325 QID   Hydrocodone 10/325 mg qid - effective - decrease pain level down to a 2-3. Able to be more active, perform more daily activities.   Mobic 15 mg daily - helping    diclofenac-misoprostol 50-0.2 mg bid prn - does not take every day--- GI DISCONTINUED    Current pain medications:   hydro/apap 7.5-325 mg TID  Gabapentin 600 mg BID - helping  Elavil 50 mg qhs - helping him sleep      Past therapies:  Physical Therapy: yes - last visit was over 1 year ago.   Chiropractor: yes  Massage Therapy: no  TENS: yes - used at PT and chiropractor  Neck or back surgery: no      Previous Injections: bilateral L3-L5 MBB 1-23-19  Effect of Injection (%): 80%  Length of Relief: 1 week    Previous Injections: bilateral L3-L5 MBB 8/14/18  Effect of Injection (%): 90%  Length of Relief: 24 hours (50% X 1 month)     Previous Injection: Lumbar Epidural Steroid Injection at  L5/S1 on 8/23/17  Effect of Injection (%): 15%  Length of Relief: 3-5 days       Previous Injections: yes, in his lower back at [facility]- LESI x 3 - minimal benefit.   Effect of Injection (%): 0%, he said if it had any affect it did not last long    PEG Assessment   What number best describes your pain on average in the past week?5  What number best describes how, during the past week, pain has interfered with your enjoyment of life?6  What number best describes how, during the past week, pain has interfered with your general activity?  6    The following portions of the patient's history were reviewed and updated as appropriate: allergies, current medications, past family history, past medical history, past social history, past surgical history and problem list.    Review of Systems   Constitutional: Positive for activity change (decreased). Negative for chills and fever.   Respiratory: Positive for shortness of breath (copd).  "Negative for cough and wheezing.    Cardiovascular: Negative for chest pain.   Gastrointestinal: Positive for abdominal pain. Negative for bowel incontinence, constipation, diarrhea and nausea. Anal bleeding: RUQ.   Genitourinary: Negative for bladder incontinence, difficulty urinating and dysuria.   Musculoskeletal: Positive for back pain.   Neurological: Positive for weakness, light-headedness (occ when coughing) and numbness (left thigh). Negative for dizziness and headaches.   Psychiatric/Behavioral: Positive for agitation and sleep disturbance (trazadone for sleep helps). Negative for confusion, hallucinations and suicidal ideas. The patient is nervous/anxious.        Vitals:    03/11/19 0843 03/11/19 0845   BP: 143/80    Pulse: 102    Resp: 14    Temp: 97.8 °F (36.6 °C)    SpO2: 96%    Weight: 112 kg (246 lb)    Height: 175.3 cm (69.02\")    PainSc:   4   6   PainLoc: Back Knee  Comment: right     Objective   Physical Exam   Constitutional: He is oriented to person, place, and time. Vital signs are normal. He appears well-developed and well-nourished. He is cooperative.   HENT:   Head: Normocephalic and atraumatic.   Nose: Nose normal.   Eyes: Conjunctivae and lids are normal.   Cardiovascular: Normal rate.   Pulmonary/Chest: Effort normal.   Abdominal: Normal appearance.   Musculoskeletal:        Right knee: He exhibits swelling (minimal medial swelling). He exhibits no ecchymosis, no deformity and no erythema. Tenderness found. Medial joint line tenderness noted.        Lumbar back: He exhibits decreased range of motion, bony tenderness (bilateral L3-L5 facet tenderness moderate; + loading manuever) and pain.   Neurological: He is alert and oriented to person, place, and time. He has normal strength and normal reflexes. No cranial nerve deficit. Coordination and gait normal.   Reflex Scores:       Patellar reflexes are 2+ on the right side and 2+ on the left side.  Skin: Skin is warm, dry and intact. "   Psychiatric: He has a normal mood and affect. His speech is normal and behavior is normal. Judgment and thought content normal. Cognition and memory are normal.   Nursing note and vitals reviewed.      Assessment/Plan   Jono was seen today for back pain.    Diagnoses and all orders for this visit:    Other chronic pain    DDD (degenerative disc disease), lumbar  -     HYDROcodone-acetaminophen (NORCO) 7.5-325 MG per tablet; Take 1 tablet by mouth 3 (Three) Times a Day.    Lumbar spondylolysis  -     Case Request    Encounter for monitoring opioid maintenance therapy    Chronic bilateral low back pain with bilateral sciatica  -     HYDROcodone-acetaminophen (NORCO) 7.5-325 MG per tablet; Take 1 tablet by mouth 3 (Three) Times a Day.      --- The urine drug screen confirmation from 1-11-19 has been reviewed and the result is APPROPRIATE based on patient history and LUISA report  --- bilateral L3-L5 RFL. No blood thinners. Reviewed the procedure at length with the patient.  Included in the review was expectations, complications, risk and benefits.The procedure was described in detail and the risks, benefits and alternatives were discussed with the patient (including but not limited to: bleeding, infection, nerve damage, worsening of pain, inability to perform injection, paralysis, seizures, and death) who agreed to proceed.  Discussed the potential for sedation if warranted/wanted.  The procedure will plan to be performed at [facility] with fluoroscopic guidance(unless ultrasound is indicated). Questions were answered and in a way the patient could understand.  Patient verbalized understanding and wishes to proceed.  This intervention will be ordered.  --- Refill hydrocodone DNF until 3-13-19. Patient appears stable with current regimen. No adverse effects. Regarding continuation of opioids, there is no evidence of aberrant behavior or any red flags.  The patient continues with  "appropriate response to opioid therapy. ADL's remain intact by self.   --- Declines knee xray or PT at this time.  --- Follow-up 2 months or sooner if needed.    -------  Education about Medial Branch Blockade and RF Therapy:    This medial branch blockade (MBB) suggested is intended for diagnostic purposes, with the intent of offering the patient Radiofrequency thermal rhizotomy (RF) if the MBB is diagnostically effective.  The diagnostic blockade is necessary to determine the likelihood that RF therapy could be efficacious in providing long term relief to the patient.    Medial branches are sensory nerve branches that connect to a facet joint and transmit sensations & pain signals from that joint.  Facet is a term for the type of joints found in the spine.  Medial branches are the nerves that go to a facet, and therefore are also sometimes called \"facet joint nerves\" (FJNs).      In a medial branch blockade procedure, xray fluoroscopy is used to verify the locations of the outside of the joint lines which are being targeted.  Under xray guidance, needles are placed to these areas.  Contrast dye is injected to confirm proper placement, with dye flowing over the joint area, and to ensure that the dye does not flow into unintended areas such as a vein.  When this is confirmed, local anesthetic is injected to block the medial branch at that joint level.      If MBBs are diagnostically successful in blocking pain, then the patient is most likely a great candidate for Radiofrequency of those facet joint nerves.  In the RF procedure, needles are placed to the joint lines in the same fashion, and after testing, the needle tips are heated to thermally treat the nerves, blocking the nerves by in essence damaging the nerves with the heat treatment.       Medically, a successful RF procedure should provide a patient with 50% pain relief or more for at least 6 months.  Clinical experience suggests that successful patients " receive relief more in the range of 12 months on average.  We also discussed that a fortunate minority of patients receive therapeutic success from the MBB, and may not require RF ablation.  If a patient receives more than 8 weeks of relief from MBB, then occasional repeat MBB for therapeutic purposes is a very reasonable alternative therapy.  This course of therapy is consistent with our LCDs according to our CMS  in the area, and therefore other insurance providers should follow accordingly.  We will monitor our patients to screen for these therapeutic responders and will offer RF therapy only when necessary.        We discussed that MBB & RF are not without risks.  Guidelines regarding anticoagulant use & neuraxial procedures will be respected.  Patients that are ill or otherwise may be at risk for sepsis will not have their spines accessed by neuraxial injections of any type.  This patient will not be offered these therapies if there is an increased risk.   We discussed that there is a risk of postprocedural pain and also a risk of worsening of clinical picture with these procedures as with any neuraxial procedure.    -------           LUISA REPORT    As part of the patient's treatment plan, I am prescribing controlled substances. The patient has been made aware of appropriate use of such medications, including potential risk of somnolence, limited ability to drive and/or work safely, and the potential for dependence or overdose. It has also bee made clear that these medications are for use by this patient only, without concomitant use of alcohol or other substances unless prescribed.     Patient has completed prescribing agreement detailing terms of continued prescribing of controlled substances, including monitoring LUISA reports, urine drug screening, and pill counts if necessary. The patient is aware that inappropriate use will results in cessation of prescribing such medications.    LUISA  report has been reviewed and scanned into the patient's chart.    As the clinician, I personally reviewed the LUISA from 3-8-19 while the patient was in the office today.    History and physical exam exhibit continued safe and appropriate use of controlled substances.      EMR Dragon/Transcription disclaimer:   Much of this encounter note is an electronic transcription/translation of spoken language to printed text. The electronic translation of spoken language may permit erroneous, or at times, nonsensical words or phrases to be inadvertently transcribed; Although I have reviewed the note for such errors, some may still exist.

## 2019-03-27 RX ORDER — SUCRALFATE 1 G/1
TABLET ORAL
Qty: 90 TABLET | Refills: 0 | Status: SHIPPED | OUTPATIENT
Start: 2019-03-27 | End: 2019-04-25 | Stop reason: SDUPTHER

## 2019-03-30 DIAGNOSIS — G89.29 CHRONIC BILATERAL LOW BACK PAIN WITH BILATERAL SCIATICA: ICD-10-CM

## 2019-03-30 DIAGNOSIS — M54.41 CHRONIC BILATERAL LOW BACK PAIN WITH BILATERAL SCIATICA: ICD-10-CM

## 2019-03-30 DIAGNOSIS — M54.42 CHRONIC BILATERAL LOW BACK PAIN WITH BILATERAL SCIATICA: ICD-10-CM

## 2019-04-01 RX ORDER — GABAPENTIN 600 MG/1
TABLET ORAL
Qty: 90 TABLET | Refills: 3 | Status: SHIPPED | OUTPATIENT
Start: 2019-04-01 | End: 2019-08-02 | Stop reason: SDUPTHER

## 2019-04-08 DIAGNOSIS — M51.36 DDD (DEGENERATIVE DISC DISEASE), LUMBAR: ICD-10-CM

## 2019-04-08 DIAGNOSIS — G89.29 CHRONIC BILATERAL LOW BACK PAIN WITH BILATERAL SCIATICA: ICD-10-CM

## 2019-04-08 DIAGNOSIS — M54.42 CHRONIC BILATERAL LOW BACK PAIN WITH BILATERAL SCIATICA: ICD-10-CM

## 2019-04-08 DIAGNOSIS — M54.41 CHRONIC BILATERAL LOW BACK PAIN WITH BILATERAL SCIATICA: ICD-10-CM

## 2019-04-08 RX ORDER — HYDROCODONE BITARTRATE AND ACETAMINOPHEN 7.5; 325 MG/1; MG/1
1 TABLET ORAL 3 TIMES DAILY
Qty: 90 TABLET | Refills: 0 | Status: SHIPPED | OUTPATIENT
Start: 2019-04-08 | End: 2019-05-09

## 2019-04-08 NOTE — TELEPHONE ENCOUNTER
Medication Refill Request    Date of phone call: 19    Medication being requested: Norco 7.5-325mg si tab po TID   Qty: 90    Date of last visit: 3/11/19    Date of last refill: 3/13/19    LUISA up to date?: yes    Next Follow up?: 19    Any new pertinent information? (i.e, new medication allergies, new use of medications, change in patient's health or condition, non-compliance or inconsistency with prescribing agreement?):

## 2019-04-26 RX ORDER — SUCRALFATE 1 G/1
TABLET ORAL
Qty: 90 TABLET | Refills: 0 | Status: SHIPPED | OUTPATIENT
Start: 2019-04-26 | End: 2019-05-30 | Stop reason: SDUPTHER

## 2019-05-01 RX ORDER — BISOPROLOL FUMARATE AND HYDROCHLOROTHIAZIDE 5; 6.25 MG/1; MG/1
TABLET ORAL
Qty: 30 TABLET | Refills: 0 | Status: SHIPPED | OUTPATIENT
Start: 2019-05-01 | End: 2019-05-30 | Stop reason: SDUPTHER

## 2019-05-02 ENCOUNTER — TELEPHONE (OUTPATIENT)
Dept: PAIN MEDICINE | Facility: CLINIC | Age: 44
End: 2019-05-02

## 2019-05-02 NOTE — TELEPHONE ENCOUNTER
Mr. Garza hasn't returned my call yet. He has an appt with you on 5/9/19.    ----- Message from KEV Peterson sent at 3/26/2019  7:55 AM EDT -----  Regarding: pill count  PILL COUNT AND RANDOM UDS. Thanks. Bb    I called Mr. Garza got no answer and he doesn't have a voicemail setup. DM 3/26/19        I called Mr. Garza and left a message on his voicemail asking him to call the office. 4/29/19 DM  per BB ok to try to call in for a pill count.        I called Mr. Garza and left a message on his voicemail asking him to call the office. 5/1/19 ANUJA

## 2019-05-06 ENCOUNTER — CLINICAL SUPPORT (OUTPATIENT)
Dept: PAIN MEDICINE | Facility: CLINIC | Age: 44
End: 2019-05-06

## 2019-05-06 DIAGNOSIS — G89.29 OTHER CHRONIC PAIN: Primary | ICD-10-CM

## 2019-05-06 PROCEDURE — 80305 DRUG TEST PRSMV DIR OPT OBS: CPT | Performed by: NURSE PRACTITIONER

## 2019-05-06 NOTE — PROGRESS NOTES
Mr. Garza came in today for a pill count and uds. His prelim uds was positive for tca only. He brought in his hydro/apap 7.5-325 mg take 1 tab TID qty 90 last filled on 4/12/19 at [facility] pharmacy. He had 2/90 pills left in his bottle. He states that he has been taking 4 pills a day for the last 2 and half weeks since he started working.

## 2019-05-07 NOTE — PROGRESS NOTES
Discussed with Gisele ANGULO. Plan will be to either change to Butrans patch only or change to interventional status only. Please make sure to check for UDS confirmation again on 5-9-19 when his appointment is. Thanks. CELSA

## 2019-05-09 ENCOUNTER — OFFICE VISIT (OUTPATIENT)
Dept: PAIN MEDICINE | Facility: CLINIC | Age: 44
End: 2019-05-09

## 2019-05-09 VITALS
SYSTOLIC BLOOD PRESSURE: 111 MMHG | WEIGHT: 237 LBS | RESPIRATION RATE: 16 BRPM | HEART RATE: 83 BPM | OXYGEN SATURATION: 92 % | BODY MASS INDEX: 35.1 KG/M2 | TEMPERATURE: 97.5 F | DIASTOLIC BLOOD PRESSURE: 77 MMHG | HEIGHT: 69 IN

## 2019-05-09 DIAGNOSIS — M43.06 LUMBAR SPONDYLOLYSIS: ICD-10-CM

## 2019-05-09 DIAGNOSIS — G89.29 OTHER CHRONIC PAIN: Primary | ICD-10-CM

## 2019-05-09 DIAGNOSIS — M51.36 DDD (DEGENERATIVE DISC DISEASE), LUMBAR: ICD-10-CM

## 2019-05-09 DIAGNOSIS — Z51.81 ENCOUNTER FOR MONITORING OPIOID MAINTENANCE THERAPY: ICD-10-CM

## 2019-05-09 DIAGNOSIS — Z79.891 ENCOUNTER FOR MONITORING OPIOID MAINTENANCE THERAPY: ICD-10-CM

## 2019-05-09 PROCEDURE — 99214 OFFICE O/P EST MOD 30 MIN: CPT | Performed by: NURSE PRACTITIONER

## 2019-05-09 RX ORDER — BUPRENORPHINE 10 UG/H
10 PATCH TRANSDERMAL WEEKLY
Qty: 4 PATCH | Refills: 0 | Status: SHIPPED | OUTPATIENT
Start: 2019-05-09 | End: 2021-11-15

## 2019-05-09 NOTE — PROGRESS NOTES
"CHIEF COMPLAINT  Pt is here to f/u on back pain. Pt sts his pain has slightly increased.    Subjective   Jono Garza is a 43 y.o. male  who presents to the office for follow-up.He has a history of chronic back pain. Reports this is worse since last office visit.    Complains of pain in his low back. Today his pain is 6/10VAS. Describes the pain as continuous aching and throbbing. Pain increases with activity and work. He had been previously prescribed Hydrocodone 7.5/3256 3/day. He was called in for a pill count. He only had 2 left. He was 16 short based on the count. His UDS from pill count is still pending.Denies any side effects from this. It did help his pain but he admits he was taking more regularly. ADL's by self. Denies any bowel or bladder changes. \"I made a booboo.\"     He had a bilateral L3-L5 MBB performed by Dr. Duarte on 1-23-19.  He reports He had 75-80% relief for 1 week. Then he returned to pre-procedure pain level after 1 week. At his last office visit, he was ordered to have lumbar RFL. This was denied by insurance but we are in the process of appealing this.   Back Pain   This is a chronic problem. The current episode started more than 1 year ago. The problem occurs constantly. Progression since onset: slightly worse since last office visit. The pain is present in the lumbar spine. The pain is at a severity of 6/10. The pain is moderate. The pain is worse during the day. The symptoms are aggravated by bending, position, standing and twisting. Associated symptoms include numbness (left thigh) and weakness. Pertinent negatives include no abdominal pain, bladder incontinence, bowel incontinence, chest pain, dysuria, fever or headaches. Risk factors include lack of exercise, obesity and sedentary lifestyle. He has tried analgesics (gabapentin, LMBB) for the symptoms. The treatment provided moderate relief.      Past pain medications: Hydrocodone 5/325 and 7.5/325 QID   Hydrocodone 10/325 " mg qid - effective - decrease pain level down to a 2-3. Able to be more active, perform more daily activities.   Mobic 15 mg daily - helping    diclofenac-misoprostol 50-0.2 mg bid prn - does not take every day--- GI DISCONTINUED     Current pain medications:   hydro/apap 7.5-325 mg TID  Gabapentin 600 mg BID - helping  Elavil 50 mg qhs - helping him sleep      Past therapies:  Physical Therapy: yes - last visit was over 1 year ago.   Chiropractor: yes  Massage Therapy: no  TENS: yes - used at PT and chiropractor  Neck or back surgery: no      Previous Injections: bilateral L3-L5 MBB 1-23-19  Effect of Injection (%): 80%  Length of Relief: 1 week     Previous Injections: bilateral L3-L5 MBB 8/14/18  Effect of Injection (%): 90%  Length of Relief: 24 hours (50% X 1 month)     Previous Injection: Lumbar Epidural Steroid Injection at  L5/S1 on 8/23/17  Effect of Injection (%): 15%  Length of Relief: 3-5 days       Previous Injections: yes, in his lower back at [facility]- LESI x 3 - minimal benefit.   Effect of Injection (%): 0%, he said if it had any affect it did not last long        PEG Assessment   What number best describes your pain on average in the past week?7  What number best describes how, during the past week, pain has interfered with your enjoyment of life?7  What number best describes how, during the past week, pain has interfered with your general activity?  7    The following portions of the patient's history were reviewed and updated as appropriate: allergies, current medications, past family history, past medical history, past social history, past surgical history and problem list.    Review of Systems   Constitutional: Negative for fatigue and fever.   HENT: Negative for congestion.    Respiratory: Negative for shortness of breath.    Cardiovascular: Negative for chest pain.   Gastrointestinal: Negative for abdominal pain and bowel incontinence.   Genitourinary: Negative for bladder  "incontinence, difficulty urinating and dysuria.   Musculoskeletal: Positive for back pain. Negative for neck pain.   Neurological: Positive for weakness and numbness (left thigh). Negative for dizziness and headaches.   Psychiatric/Behavioral: Negative for sleep disturbance.       Vitals:    05/09/19 0921   BP: 111/77   Pulse: 83   Resp: 16   Temp: 97.5 °F (36.4 °C)   SpO2: 92%   Weight: 108 kg (237 lb)   Height: 175.3 cm (69.02\")   PainSc:   6   PainLoc: Back     Objective   Physical Exam   Constitutional: He is oriented to person, place, and time. Vital signs are normal. He appears well-developed and well-nourished. He is cooperative.   HENT:   Head: Normocephalic and atraumatic.   Nose: Nose normal.   Eyes: Conjunctivae and lids are normal.   Cardiovascular: Normal rate.   Pulmonary/Chest: Effort normal.   Abdominal: Normal appearance.   Musculoskeletal:        Right knee: He exhibits swelling (minimal medial swelling). He exhibits no ecchymosis, no deformity and no erythema. Tenderness found. Medial joint line tenderness noted.        Lumbar back: He exhibits decreased range of motion, bony tenderness (bilateral L3-L5 facet tenderness moderate; + loading manuever) and pain.   Neurological: He is alert and oriented to person, place, and time. Gait normal.   Skin: Skin is warm, dry and intact.   Psychiatric: He has a normal mood and affect. His speech is normal and behavior is normal. Judgment and thought content normal. Cognition and memory are normal.   Nursing note and vitals reviewed.      Assessment/Plan   Jono was seen today for back pain.    Diagnoses and all orders for this visit:    Other chronic pain    Lumbar spondylolysis    DDD (degenerative disc disease), lumbar    Encounter for monitoring opioid maintenance therapy    Other orders  -     Buprenorphine 10 MCG/HR patch weekly; Place 10 mcg on the skin as directed by provider 1 (One) Time Per Week.      --- The urine drug screen confirmation " from 5-6-19 has been reviewed and the result is PENDING  --- Trial of Butrans 10 mcg patch weekly. Discussed medication with the patient.  Included in this discussion was the potential for side effects and adverse events.  Patient verbalized understanding and wished to proceed.  Prescription will be sent to pharmacy.  --- Discontinue Hydrocodone due to abnormal pill count.  --- Proceed with RFl when authorized. Message sent to scheduling department.   --- Follow-up 4 weeks or sooner if needed.     LUISA REPORT    As part of the patient's treatment plan, I am prescribing controlled substances. The patient has been made aware of appropriate use of such medications, including potential risk of somnolence, limited ability to drive and/or work safely, and the potential for dependence or overdose. It has also bee made clear that these medications are for use by this patient only, without concomitant use of alcohol or other substances unless prescribed.     Patient has completed prescribing agreement detailing terms of continued prescribing of controlled substances, including monitoring LUISA reports, urine drug screening, and pill counts if necessary. The patient is aware that inappropriate use will results in cessation of prescribing such medications.    LUISA report has been reviewed and scanned into the patient's chart.    As the clinician, I personally reviewed the LUISA from 5-8-19 while the patient was in the office today.    History and physical exam exhibit continued safe and appropriate use of controlled substances.      EMR Dragon/Transcription disclaimer:   Much of this encounter note is an electronic transcription/translation of spoken language to printed text. The electronic translation of spoken language may permit erroneous, or at times, nonsensical words or phrases to be inadvertently transcribed; Although I have reviewed the note for such errors, some may still exist.

## 2019-05-11 ENCOUNTER — RESULTS ENCOUNTER (OUTPATIENT)
Dept: PAIN MEDICINE | Facility: CLINIC | Age: 44
End: 2019-05-11

## 2019-05-11 DIAGNOSIS — G89.29 OTHER CHRONIC PAIN: ICD-10-CM

## 2019-05-24 ENCOUNTER — APPOINTMENT (OUTPATIENT)
Dept: CT IMAGING | Facility: HOSPITAL | Age: 44
End: 2019-05-24

## 2019-05-30 RX ORDER — PANTOPRAZOLE SODIUM 40 MG/1
TABLET, DELAYED RELEASE ORAL
Qty: 30 TABLET | Refills: 4 | Status: SHIPPED | OUTPATIENT
Start: 2019-05-30 | End: 2019-11-02 | Stop reason: SDUPTHER

## 2019-05-30 RX ORDER — BISOPROLOL FUMARATE AND HYDROCHLOROTHIAZIDE 5; 6.25 MG/1; MG/1
TABLET ORAL
Qty: 30 TABLET | Refills: 0 | Status: SHIPPED | OUTPATIENT
Start: 2019-05-30 | End: 2019-07-01 | Stop reason: SDUPTHER

## 2019-05-30 RX ORDER — SUCRALFATE 1 G/1
TABLET ORAL
Qty: 90 TABLET | Refills: 0 | Status: SHIPPED | OUTPATIENT
Start: 2019-05-30 | End: 2019-07-01 | Stop reason: SDUPTHER

## 2019-06-10 ENCOUNTER — HOSPITAL ENCOUNTER (OUTPATIENT)
Dept: CT IMAGING | Facility: HOSPITAL | Age: 44
Discharge: HOME | End: 2019-06-10
Admitting: INTERNAL MEDICINE

## 2019-06-10 DIAGNOSIS — R91.8 LUNG NODULES: ICD-10-CM

## 2019-06-10 PROCEDURE — 71250 CT THORAX DX C-: CPT

## 2019-07-01 RX ORDER — SUCRALFATE 1 G/1
TABLET ORAL
Qty: 90 TABLET | Refills: 0 | Status: SHIPPED | OUTPATIENT
Start: 2019-07-01 | End: 2019-08-05 | Stop reason: SDUPTHER

## 2019-07-02 RX ORDER — BISOPROLOL FUMARATE AND HYDROCHLOROTHIAZIDE 5; 6.25 MG/1; MG/1
TABLET ORAL
Qty: 30 TABLET | Refills: 0 | Status: SHIPPED | OUTPATIENT
Start: 2019-07-02 | End: 2019-07-31 | Stop reason: SDUPTHER

## 2019-07-08 RX ORDER — NICOTINE POLACRILEX 4 MG/1
GUM, CHEWING ORAL
Qty: 180 EACH | Refills: 3 | Status: SHIPPED | OUTPATIENT
Start: 2019-07-08 | End: 2020-03-04

## 2019-07-31 RX ORDER — BISOPROLOL FUMARATE AND HYDROCHLOROTHIAZIDE 5; 6.25 MG/1; MG/1
TABLET ORAL
Qty: 30 TABLET | Refills: 2 | Status: SHIPPED | OUTPATIENT
Start: 2019-07-31 | End: 2019-11-03 | Stop reason: SDUPTHER

## 2019-08-02 DIAGNOSIS — G89.29 CHRONIC BILATERAL LOW BACK PAIN WITH BILATERAL SCIATICA: ICD-10-CM

## 2019-08-02 DIAGNOSIS — M54.41 CHRONIC BILATERAL LOW BACK PAIN WITH BILATERAL SCIATICA: ICD-10-CM

## 2019-08-02 DIAGNOSIS — M54.42 CHRONIC BILATERAL LOW BACK PAIN WITH BILATERAL SCIATICA: ICD-10-CM

## 2019-08-05 RX ORDER — GABAPENTIN 600 MG/1
TABLET ORAL
Qty: 90 TABLET | Refills: 2 | Status: SHIPPED | OUTPATIENT
Start: 2019-08-05

## 2019-08-19 RX ORDER — SUCRALFATE 1 G/1
TABLET ORAL
Qty: 90 TABLET | Refills: 0 | Status: SHIPPED | OUTPATIENT
Start: 2019-08-19 | End: 2019-09-17 | Stop reason: SDUPTHER

## 2019-09-18 RX ORDER — SUCRALFATE 1 G/1
TABLET ORAL
Qty: 90 TABLET | Refills: 0 | Status: SHIPPED | OUTPATIENT
Start: 2019-09-18 | End: 2019-10-16 | Stop reason: SDUPTHER

## 2019-10-16 RX ORDER — SUCRALFATE 1 G/1
TABLET ORAL
Qty: 90 TABLET | Refills: 0 | Status: SHIPPED | OUTPATIENT
Start: 2019-10-16 | End: 2019-11-17 | Stop reason: SDUPTHER

## 2019-11-04 RX ORDER — BISOPROLOL FUMARATE AND HYDROCHLOROTHIAZIDE 5; 6.25 MG/1; MG/1
TABLET ORAL
Qty: 30 TABLET | Refills: 1 | Status: SHIPPED | OUTPATIENT
Start: 2019-11-04 | End: 2020-06-16 | Stop reason: SDUPTHER

## 2019-11-04 RX ORDER — PANTOPRAZOLE SODIUM 40 MG/1
TABLET, DELAYED RELEASE ORAL
Qty: 30 TABLET | Refills: 3 | Status: SHIPPED | OUTPATIENT
Start: 2019-11-04 | End: 2020-03-04

## 2019-11-22 RX ORDER — SUCRALFATE 1 G/1
TABLET ORAL
Qty: 90 TABLET | Refills: 0 | Status: SHIPPED | OUTPATIENT
Start: 2019-11-22 | End: 2019-12-23

## 2019-12-23 RX ORDER — SUCRALFATE 1 G/1
TABLET ORAL
Qty: 90 TABLET | Refills: 0 | Status: SHIPPED | OUTPATIENT
Start: 2019-12-23 | End: 2020-06-15 | Stop reason: SDUPTHER

## 2020-01-03 RX ORDER — BISOPROLOL FUMARATE AND HYDROCHLOROTHIAZIDE 5; 6.25 MG/1; MG/1
TABLET ORAL
Qty: 30 TABLET | Refills: 0 | OUTPATIENT
Start: 2020-01-03

## 2020-01-15 ENCOUNTER — TELEPHONE (OUTPATIENT)
Dept: ORTHOPEDIC SURGERY | Facility: CLINIC | Age: 45
End: 2020-01-15

## 2020-01-15 NOTE — TELEPHONE ENCOUNTER
Received a referral from [facility] for chronic bilateral low back pain with bilateral sciatica.   Called patient LM stated provider does not treat that and recommended patient to call a back specialist.   If they had any questions to call office.

## 2020-03-04 RX ORDER — PANTOPRAZOLE SODIUM 40 MG/1
TABLET, DELAYED RELEASE ORAL
Qty: 180 TABLET | Refills: 3 | Status: SHIPPED | OUTPATIENT
Start: 2020-03-04 | End: 2020-07-08

## 2020-05-12 RX ORDER — BISOPROLOL FUMARATE AND HYDROCHLOROTHIAZIDE 5; 6.25 MG/1; MG/1
TABLET ORAL
Qty: 30 TABLET | Refills: 0 | OUTPATIENT
Start: 2020-05-12

## 2020-06-15 RX ORDER — SUCRALFATE 1 G/1
1 TABLET ORAL 3 TIMES DAILY
Qty: 270 TABLET | Refills: 0 | Status: SHIPPED | OUTPATIENT
Start: 2020-06-15 | End: 2021-01-11

## 2020-06-16 RX ORDER — BISOPROLOL FUMARATE AND HYDROCHLOROTHIAZIDE 5; 6.25 MG/1; MG/1
1 TABLET ORAL DAILY
Qty: 30 TABLET | Refills: 1 | Status: SHIPPED | OUTPATIENT
Start: 2020-06-16

## 2020-07-08 RX ORDER — NICOTINE POLACRILEX 4 MG/1
1 GUM, CHEWING ORAL 2 TIMES DAILY
Qty: 180 EACH | Refills: 3 | Status: SHIPPED | OUTPATIENT
Start: 2020-07-08

## 2020-07-08 RX ORDER — NICOTINE POLACRILEX 4 MG/1
GUM, CHEWING ORAL
COMMUNITY
Start: 2019-07-08 | End: 2020-07-08 | Stop reason: SDUPTHER

## 2020-08-20 RX ORDER — BISOPROLOL FUMARATE AND HYDROCHLOROTHIAZIDE 5; 6.25 MG/1; MG/1
TABLET ORAL
Qty: 30 TABLET | Refills: 0 | OUTPATIENT
Start: 2020-08-20

## 2020-09-08 ENCOUNTER — OFFICE VISIT (OUTPATIENT)
Dept: ONCOLOGY | Facility: CLINIC | Age: 45
End: 2020-09-08

## 2020-09-08 ENCOUNTER — LAB (OUTPATIENT)
Dept: LAB | Facility: HOSPITAL | Age: 45
End: 2020-09-08

## 2020-09-08 VITALS
DIASTOLIC BLOOD PRESSURE: 78 MMHG | SYSTOLIC BLOOD PRESSURE: 151 MMHG | RESPIRATION RATE: 14 BRPM | HEIGHT: 68 IN | OXYGEN SATURATION: 95 % | BODY MASS INDEX: 37.59 KG/M2 | HEART RATE: 105 BPM | TEMPERATURE: 97.8 F | WEIGHT: 248 LBS

## 2020-09-08 DIAGNOSIS — D72.820 LYMPHOCYTOSIS: Primary | ICD-10-CM

## 2020-09-08 DIAGNOSIS — R89.9 ABNORMAL LABORATORY TEST RESULT: Primary | ICD-10-CM

## 2020-09-08 LAB
BASOPHILS # BLD AUTO: 0.11 10*3/MM3 (ref 0–0.2)
BASOPHILS NFR BLD AUTO: 0.9 % (ref 0–1.5)
DEPRECATED RDW RBC AUTO: 44.7 FL (ref 37–54)
EOSINOPHIL # BLD AUTO: 0.34 10*3/MM3 (ref 0–0.4)
EOSINOPHIL NFR BLD AUTO: 2.8 % (ref 0.3–6.2)
ERYTHROCYTE [DISTWIDTH] IN BLOOD BY AUTOMATED COUNT: 12.7 % (ref 12.3–15.4)
HCT VFR BLD AUTO: 46.1 % (ref 37.5–51)
HGB BLD-MCNC: 15.4 G/DL (ref 13–17.7)
IMM GRANULOCYTES # BLD AUTO: 0.06 10*3/MM3 (ref 0–0.05)
IMM GRANULOCYTES NFR BLD AUTO: 0.5 % (ref 0–0.5)
LYMPHOCYTES # BLD AUTO: 3.84 10*3/MM3 (ref 0.7–3.1)
LYMPHOCYTES NFR BLD AUTO: 31.9 % (ref 19.6–45.3)
MCH RBC QN AUTO: 31.9 PG (ref 26.6–33)
MCHC RBC AUTO-ENTMCNC: 33.4 G/DL (ref 31.5–35.7)
MCV RBC AUTO: 95.4 FL (ref 79–97)
MONOCYTES # BLD AUTO: 0.93 10*3/MM3 (ref 0.1–0.9)
MONOCYTES NFR BLD AUTO: 7.7 % (ref 5–12)
NEUTROPHILS NFR BLD AUTO: 56.2 % (ref 42.7–76)
NEUTROPHILS NFR BLD AUTO: 6.77 10*3/MM3 (ref 1.7–7)
NRBC BLD AUTO-RTO: 0 /100 WBC (ref 0–0.2)
PLATELET # BLD AUTO: 399 10*3/MM3 (ref 140–450)
PMV BLD AUTO: 9.7 FL (ref 6–12)
RBC # BLD AUTO: 4.83 10*6/MM3 (ref 4.14–5.8)
WBC # BLD AUTO: 12.05 10*3/MM3 (ref 3.4–10.8)

## 2020-09-08 PROCEDURE — 36415 COLL VENOUS BLD VENIPUNCTURE: CPT

## 2020-09-08 PROCEDURE — 85025 COMPLETE CBC W/AUTO DIFF WBC: CPT

## 2020-09-08 PROCEDURE — 99213 OFFICE O/P EST LOW 20 MIN: CPT | Performed by: INTERNAL MEDICINE
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
## 2020-09-22 ENCOUNTER — TELEPHONE (OUTPATIENT)
Dept: ONCOLOGY | Facility: CLINIC | Age: 45
End: 2020-09-22

## 2020-09-22 NOTE — TELEPHONE ENCOUNTER
Caller: FINA HAUSER    Relationship to patient: SELF    Best call back number: [phone]    PT STATES HE WAS IN TO SEE DR. WRIGHT A FEW WEEKS AGO AND DR WRIGHT HAD MENTIONED DOING LABWORK FOR WHICH THE PT'S INSURANCE WOULD HAVE TO APPROVE. PT STATES THAT DR WRIGHT TOLD HIM TO CALL BACK IF HE HAD NOT HEARD ANYTHING IN A FEW WEEKS, AND THE PT HAS NOT.

## 2020-11-18 ENCOUNTER — TRANSCRIBE ORDERS (OUTPATIENT)
Dept: ADMINISTRATIVE | Facility: HOSPITAL | Age: 45
End: 2020-11-18

## 2020-11-18 DIAGNOSIS — R91.1 LUNG NODULE: ICD-10-CM

## 2020-11-18 DIAGNOSIS — J47.9 BRONCHIECTASIS WITHOUT COMPLICATION (HCC): Primary | ICD-10-CM

## 2020-12-31 ENCOUNTER — HOSPITAL ENCOUNTER (OUTPATIENT)
Dept: CT IMAGING | Facility: HOSPITAL | Age: 45
Discharge: HOME | End: 2020-12-31
Admitting: INTERNAL MEDICINE

## 2020-12-31 DIAGNOSIS — R91.1 LUNG NODULE: ICD-10-CM

## 2020-12-31 DIAGNOSIS — J47.9 BRONCHIECTASIS WITHOUT COMPLICATION (HCC): ICD-10-CM

## 2020-12-31 PROCEDURE — 71250 CT THORAX DX C-: CPT

## 2021-01-11 RX ORDER — SUCRALFATE 1 G/1
TABLET ORAL
Qty: 270 TABLET | Refills: 3 | Status: SHIPPED | OUTPATIENT
Start: 2021-01-11

## 2021-06-02 NOTE — PROGRESS NOTES
Lfts and lipase normal Alert-The patient is alert, awake and responds to voice. The patient is oriented to time, place, and person. The triage nurse is able to obtain subjective information.

## 2021-09-15 ENCOUNTER — TRANSCRIBE ORDERS (OUTPATIENT)
Dept: OBSTETRICS AND GYNECOLOGY | Facility: CLINIC | Age: 46
End: 2021-09-15

## 2021-09-15 DIAGNOSIS — Z01.818 OTHER SPECIFIED PRE-OPERATIVE EXAMINATION: Primary | ICD-10-CM

## 2021-09-18 ENCOUNTER — LAB (OUTPATIENT)
Dept: LAB | Facility: HOSPITAL | Age: 46
End: 2021-09-18

## 2021-09-18 DIAGNOSIS — Z01.818 OTHER SPECIFIED PRE-OPERATIVE EXAMINATION: ICD-10-CM

## 2021-09-18 LAB — SARS-COV-2 RNA PNL SPEC NAA+PROBE: NOT DETECTED

## 2021-09-18 PROCEDURE — C9803 HOPD COVID-19 SPEC COLLECT: HCPCS

## 2021-09-18 PROCEDURE — 87635 SARS-COV-2 COVID-19 AMP PRB: CPT | Performed by: OBSTETRICS & GYNECOLOGY

## 2021-11-12 ENCOUNTER — TRANSCRIBE ORDERS (OUTPATIENT)
Dept: SLEEP MEDICINE | Facility: HOSPITAL | Age: 46
End: 2021-11-12

## 2021-11-12 DIAGNOSIS — Z01.818 OTHER SPECIFIED PRE-OPERATIVE EXAMINATION: Primary | ICD-10-CM

## 2021-11-13 ENCOUNTER — LAB (OUTPATIENT)
Dept: LAB | Facility: HOSPITAL | Age: 46
End: 2021-11-13

## 2021-11-13 DIAGNOSIS — Z01.818 OTHER SPECIFIED PRE-OPERATIVE EXAMINATION: ICD-10-CM

## 2021-11-13 LAB — SARS-COV-2 RNA PNL SPEC NAA+PROBE: NOT DETECTED

## 2021-11-13 PROCEDURE — 87635 SARS-COV-2 COVID-19 AMP PRB: CPT | Performed by: INTERNAL MEDICINE

## 2021-11-13 PROCEDURE — C9803 HOPD COVID-19 SPEC COLLECT: HCPCS

## 2021-11-15 RX ORDER — HYDROCODONE BITARTRATE AND ACETAMINOPHEN 7.5; 325 MG/1; MG/1
1 TABLET ORAL 3 TIMES DAILY
COMMUNITY

## 2021-11-16 ENCOUNTER — ANESTHESIA (OUTPATIENT)
Dept: GASTROENTEROLOGY | Facility: HOSPITAL | Age: 46
End: 2021-11-16

## 2021-11-16 ENCOUNTER — ANESTHESIA EVENT (OUTPATIENT)
Dept: GASTROENTEROLOGY | Facility: HOSPITAL | Age: 46
End: 2021-11-16

## 2021-11-16 ENCOUNTER — HOSPITAL ENCOUNTER (OUTPATIENT)
Facility: HOSPITAL | Age: 46
Setting detail: HOSPITAL OUTPATIENT SURGERY
Discharge: HOME | End: 2021-11-16
Attending: INTERNAL MEDICINE | Admitting: INTERNAL MEDICINE

## 2021-11-16 VITALS
RESPIRATION RATE: 16 BRPM | BODY MASS INDEX: 34.72 KG/M2 | TEMPERATURE: 98.1 F | HEART RATE: 71 BPM | DIASTOLIC BLOOD PRESSURE: 81 MMHG | WEIGHT: 234.44 LBS | SYSTOLIC BLOOD PRESSURE: 111 MMHG | HEIGHT: 69 IN | OXYGEN SATURATION: 95 %

## 2021-11-16 DIAGNOSIS — J47.9 BRONCHIECTASIS (HCC): ICD-10-CM

## 2021-11-16 LAB — GIE STN SPEC: NORMAL

## 2021-11-16 PROCEDURE — 88312 SPECIAL STAINS GROUP 1: CPT | Performed by: INTERNAL MEDICINE

## 2021-11-16 PROCEDURE — 87116 MYCOBACTERIA CULTURE: CPT | Performed by: INTERNAL MEDICINE

## 2021-11-16 PROCEDURE — 25010000002 PROPOFOL 10 MG/ML EMULSION: Performed by: ANESTHESIOLOGY

## 2021-11-16 PROCEDURE — 87071 CULTURE AEROBIC QUANT OTHER: CPT | Performed by: INTERNAL MEDICINE

## 2021-11-16 PROCEDURE — 88305 TISSUE EXAM BY PATHOLOGIST: CPT | Performed by: INTERNAL MEDICINE

## 2021-11-16 PROCEDURE — 87206 SMEAR FLUORESCENT/ACID STAI: CPT | Performed by: INTERNAL MEDICINE

## 2021-11-16 PROCEDURE — 87205 SMEAR GRAM STAIN: CPT | Performed by: INTERNAL MEDICINE

## 2021-11-16 PROCEDURE — 87077 CULTURE AEROBIC IDENTIFY: CPT | Performed by: INTERNAL MEDICINE

## 2021-11-16 PROCEDURE — 87186 SC STD MICRODIL/AGAR DIL: CPT | Performed by: INTERNAL MEDICINE

## 2021-11-16 PROCEDURE — 87102 FUNGUS ISOLATION CULTURE: CPT | Performed by: INTERNAL MEDICINE

## 2021-11-16 PROCEDURE — 88112 CYTOPATH CELL ENHANCE TECH: CPT | Performed by: INTERNAL MEDICINE

## 2021-11-16 RX ORDER — LIDOCAINE HYDROCHLORIDE 10 MG/ML
INJECTION, SOLUTION EPIDURAL; INFILTRATION; INTRACAUDAL; PERINEURAL AS NEEDED
Status: DISCONTINUED | OUTPATIENT
Start: 2021-11-16 | End: 2021-11-16 | Stop reason: HOSPADM

## 2021-11-16 RX ORDER — LIDOCAINE HYDROCHLORIDE 20 MG/ML
INJECTION, SOLUTION INFILTRATION; PERINEURAL AS NEEDED
Status: DISCONTINUED | OUTPATIENT
Start: 2021-11-16 | End: 2021-11-16 | Stop reason: SURG

## 2021-11-16 RX ORDER — LIDOCAINE HYDROCHLORIDE 20 MG/ML
INJECTION, SOLUTION EPIDURAL; INFILTRATION; INTRACAUDAL; PERINEURAL AS NEEDED
Status: DISCONTINUED | OUTPATIENT
Start: 2021-11-16 | End: 2021-11-16 | Stop reason: HOSPADM

## 2021-11-16 RX ORDER — SODIUM CHLORIDE, SODIUM LACTATE, POTASSIUM CHLORIDE, CALCIUM CHLORIDE 600; 310; 30; 20 MG/100ML; MG/100ML; MG/100ML; MG/100ML
30 INJECTION, SOLUTION INTRAVENOUS CONTINUOUS PRN
Status: DISCONTINUED | OUTPATIENT
Start: 2021-11-16 | End: 2021-11-16 | Stop reason: HOSPADM

## 2021-11-16 RX ORDER — PROPOFOL 10 MG/ML
VIAL (ML) INTRAVENOUS AS NEEDED
Status: DISCONTINUED | OUTPATIENT
Start: 2021-11-16 | End: 2021-11-16 | Stop reason: SURG

## 2021-11-16 RX ADMIN — SODIUM CHLORIDE, POTASSIUM CHLORIDE, SODIUM LACTATE AND CALCIUM CHLORIDE 30 ML/HR: 600; 310; 30; 20 INJECTION, SOLUTION INTRAVENOUS at 13:15

## 2021-11-16 RX ADMIN — PROPOFOL 200 MG: 10 INJECTION, EMULSION INTRAVENOUS at 14:43

## 2021-11-16 RX ADMIN — PROPOFOL 100 MG: 10 INJECTION, EMULSION INTRAVENOUS at 14:48

## 2021-11-16 RX ADMIN — LIDOCAINE HYDROCHLORIDE 60 MG: 20 INJECTION, SOLUTION INFILTRATION; PERINEURAL at 14:44

## 2021-11-16 NOTE — ANESTHESIA PROCEDURE NOTES
Airway  Urgency: elective    Date/Time: 11/16/2021 2:46 PM    General Information and Staff    Patient location during procedure: OR  Anesthesiologist: Eric Lee MD    Indications and Patient Condition  Indications for airway management: airway protection    Preoxygenated: yes  MILS maintained throughout      Final Airway Details  Final airway type: supraglottic airway      Successful airway: classic  Size 4    Number of attempts at approach: 1  Assessment: lips, teeth, and gum same as pre-op

## 2021-11-16 NOTE — ANESTHESIA PREPROCEDURE EVALUATION
Anesthesia Evaluation     Patient summary reviewed and Nursing notes reviewed                Airway   Mallampati: I  TM distance: >3 FB  Neck ROM: full  No difficulty expected  Dental - normal exam     Pulmonary - normal exam   (+) pneumonia , a smoker Current, COPD, sleep apnea,   Cardiovascular - normal exam    (+) hypertension,       Neuro/Psych  (+) headaches, numbness, psychiatric history Anxiety and Depression,     GI/Hepatic/Renal/Endo    (+) obesity,  GERD,    (-) morbid obesity    Musculoskeletal     (+) back pain,   Abdominal  - normal exam    Bowel sounds: normal.   Substance History - negative use     OB/GYN negative ob/gyn ROS         Other   arthritis,                      Anesthesia Plan    ASA 3     general       Anesthetic plan, all risks, benefits, and alternatives have been provided, discussed and informed consent has been obtained with: patient.

## 2021-11-16 NOTE — DISCHARGE INSTRUCTIONS
For the next 24 hours patient needs to be with a responsible adult.    For 24 hours DO NOT drive, operate machinery, appliances, drink alcohol, make important decisions or sign legal documents.    Start with a light or bland diet if you are feeling sick to your stomach otherwise advance to regular diet as tolerated.    Follow recommendations on procedure report if provided by your doctor.    Call Dr Mccurdy for problems     Problems may include but not limited to: large amounts of bleeding, trouble breathing, repeated vomiting, severe unrelieved pain, fever or chills.      Do NOT EAT OR DRINK UNTIL AFTER 4:47 PM

## 2021-11-16 NOTE — ANESTHESIA POSTPROCEDURE EVALUATION
Patient: Jono Garza    Procedure Summary     Date: 11/16/21 Room / Location:  [facility]    Anesthesia Start: 1431 Anesthesia Stop: 1505    Procedure: BRONCHOSCOPY WITH BAL (N/A Bronchus) Diagnosis:     Surgeons: Johanne Ashton MD Provider: Eric Lee MD    Anesthesia Type: general ASA Status: 3          Anesthesia Type: general    Vitals  Vitals Value Taken Time   /81 11/16/21 1529   Temp     Pulse 71 11/16/21 1529   Resp 16 11/16/21 1529   SpO2 95 % 11/16/21 1529           Post Anesthesia Care and Evaluation      Comments: Pt. Discharged prior to being evaluated by anesthesia.  Chart is reviewed and no complications are noted.  THIS CASE IS NOT MEDICALLY DIRECTED

## 2021-11-16 NOTE — OP NOTE
Bronchoscopy Procedure Note    Procedure:  1. Bronchoscopy, Diagnostic  2. Bronchoscopy, Therapeutic  3. Bronchoalveolar lavage, BAL    Pre-Operative Diagnosis: Bronchiectasis with secretions    Post-Operative Diagnosis: Same    Indication:    Anesthesia: Monitored Anesthesia Care (MAC)    Procedure Details: Patient was consented for the procedure with all risks and benefits of the procedure explained in detail.  Patient was given the opportunity to ask questions and all concerns were answered.  The bronchocope was inserted into the main airway via the LMA. An anatomical survey was done of the main airways and the subsegmental bronchus.  The findings are reported above.  A bronchoalveolar lavage was performed using aliquots of normal saline instilled into the right lower lobe airways then aspirated back.    Findings:    Estimated Blood Loss:  Minimal           Specimens:  Sent purulent fluid                Complications:  None; patient tolerated the procedure well.           Disposition: PACU - hemodynamically stable.      Patient tolerated the procedure well.    While I was in the room and during my examination of the patient I wore gown, gloves, mask, eye protection and washed my hands before and after the encounter.  Proper enhanced droplet precautions and isolation precautions were taken.    Johanne Ashton MD  11/16/2021  14:55 EST

## 2021-11-18 LAB
CYTO UR: NORMAL
LAB AP CASE REPORT: NORMAL
LAB AP CLINICAL INFORMATION: NORMAL
PATH REPORT.ADDENDUM SPEC: NORMAL
PATH REPORT.FINAL DX SPEC: NORMAL
PATH REPORT.GROSS SPEC: NORMAL

## 2021-11-19 LAB
BACTERIA SPEC AEROBE CULT: ABNORMAL
BACTERIA SPEC AEROBE CULT: ABNORMAL
GRAM STN SPEC: ABNORMAL
GRAM STN SPEC: ABNORMAL

## 2021-11-30 ENCOUNTER — TRANSCRIBE ORDERS (OUTPATIENT)
Dept: ADMINISTRATIVE | Facility: HOSPITAL | Age: 46
End: 2021-11-30

## 2021-11-30 DIAGNOSIS — R91.1 LUNG NODULE: Primary | ICD-10-CM

## 2021-12-19 LAB — FUNGUS WND CULT: ABNORMAL

## 2021-12-28 LAB
MYCOBACTERIUM SPEC CULT: NORMAL
NIGHT BLUE STAIN TISS: NORMAL

## 2022-01-05 RX ORDER — SUCRALFATE 1 G/1
TABLET ORAL
Qty: 270 TABLET | Refills: 3 | OUTPATIENT
Start: 2022-01-05

## 2022-01-05 NOTE — TELEPHONE ENCOUNTER
LOV 11/16/18 with Ambreen    Pt will need schedule appt to establish with [facility] or contact PCP for request

## 2022-01-10 ENCOUNTER — HOSPITAL ENCOUNTER (OUTPATIENT)
Dept: CT IMAGING | Facility: HOSPITAL | Age: 47
Discharge: HOME | End: 2022-01-10
Admitting: INTERNAL MEDICINE

## 2022-01-10 DIAGNOSIS — R91.1 LUNG NODULE: ICD-10-CM

## 2022-01-10 PROCEDURE — 71250 CT THORAX DX C-: CPT

## 2022-06-14 ENCOUNTER — OFFICE VISIT (OUTPATIENT)
Dept: GASTROENTEROLOGY | Facility: CLINIC | Age: 47
End: 2022-06-14

## 2022-06-14 ENCOUNTER — TELEPHONE (OUTPATIENT)
Dept: GASTROENTEROLOGY | Facility: CLINIC | Age: 47
End: 2022-06-14

## 2022-06-14 VITALS
WEIGHT: 217.4 LBS | TEMPERATURE: 97.7 F | OXYGEN SATURATION: 96 % | HEIGHT: 69 IN | HEART RATE: 74 BPM | BODY MASS INDEX: 32.2 KG/M2

## 2022-06-14 DIAGNOSIS — R19.7 DIARRHEA, UNSPECIFIED TYPE: ICD-10-CM

## 2022-06-14 DIAGNOSIS — K21.9 GASTROESOPHAGEAL REFLUX DISEASE, UNSPECIFIED WHETHER ESOPHAGITIS PRESENT: Primary | ICD-10-CM

## 2022-06-14 DIAGNOSIS — R10.12 LEFT UPPER QUADRANT ABDOMINAL PAIN: ICD-10-CM

## 2022-06-14 DIAGNOSIS — K63.5 POLYP OF COLON, UNSPECIFIED PART OF COLON, UNSPECIFIED TYPE: ICD-10-CM

## 2022-06-14 PROCEDURE — 99203 OFFICE O/P NEW LOW 30 MIN: CPT | Performed by: INTERNAL MEDICINE
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
## 2022-06-14 NOTE — TELEPHONE ENCOUNTER
LARA patient in person for EGD/CS. Scheduled 11/11/2022 with arrival time 9:30am. Prep packet handed to patient in office. Patient advised arrival time may vary based on [facility] guidelines. [facility]

## 2022-11-08 ENCOUNTER — TELEPHONE (OUTPATIENT)
Dept: GASTROENTEROLOGY | Facility: CLINIC | Age: 47
End: 2022-11-08

## 2022-11-08 NOTE — TELEPHONE ENCOUNTER
Caller: Jono Garza    Relationship: Self    Best call back number: [phone]    What is the best time to reach you: ANYTIME/MYCHART    Who are you requesting to speak with (clinical staff, provider,  specific staff member): CLINICAL STAFF    What was the call regarding: PT REQUESTS PREP, LOCATION, TIME OF ARRIVAL, ETC SENT VIA [employer]    Do you require a callback: MYCHART

## 2022-11-10 ENCOUNTER — TELEPHONE (OUTPATIENT)
Dept: GASTROENTEROLOGY | Facility: CLINIC | Age: 47
End: 2022-11-10

## 2022-11-10 NOTE — TELEPHONE ENCOUNTER
Hub staff attempted to follow warm transfer process and was unsuccessful     Caller: Jono Garza    Relationship to patient: Self    Best call back number: [phone]    Patient is needing: PT HAS PROC 11.11.22/WOKE UP WITH 102 TEMP TODAY. PT WISHES TO CANCEL WILL CALL BACK TO RESCHEDULE

## 2022-11-28 ENCOUNTER — TELEPHONE (OUTPATIENT)
Dept: GASTROENTEROLOGY | Facility: CLINIC | Age: 47
End: 2022-11-28

## 2022-11-28 NOTE — TELEPHONE ENCOUNTER
LARA patient via telephone for. Scheduled 2/8/23   with arrival time of 8:30 AM  . Prep paperwork mailed to verified address on file. Patient advised arrival time may change based on [facility] guidelines. LARA GONZALEZ

## 2022-11-28 NOTE — TELEPHONE ENCOUNTER
Hub staff attempted to follow warm transfer process and was unsuccessful     Caller: Jono Garza    Relationship to patient: Self    Best call back number: [phone]    Patient is needing: RESCHEDULE PROCEDURE/11.11.22

## (undated) DEVICE — Device

## (undated) DEVICE — MASK,FACE,FLUID RESIST,SHLD,EARLOOP: Brand: MEDLINE

## (undated) DEVICE — GOWN ISOL W/THUMB UNIV BLU BX/15

## (undated) DEVICE — SYR LL 3CC

## (undated) DEVICE — SINGLE USE BIOPSY VALVE MAJ-210: Brand: SINGLE USE BIOPSY VALVE (STERILE)

## (undated) DEVICE — FRCP BX RADJAW4 NDL 2.8 240CM LG OG BX40

## (undated) DEVICE — MSK AIRWY LARYNG LMA PILOT SZ4

## (undated) DEVICE — ENDO. PORT CONNECTOR W/VALVE FOR OLYMPUS® SCOPES: Brand: ERBE

## (undated) DEVICE — TUBING, SUCTION, 1/4" X 10', STRAIGHT: Brand: MEDLINE

## (undated) DEVICE — Device: Brand: DEFENDO AIR/WATER/SUCTION AND BIOPSY VALVE

## (undated) DEVICE — VITAL SIGNS™ JACKSON-REES CIRCUITS: Brand: VITAL SIGNS™

## (undated) DEVICE — SUCTION CANISTER, 3000CC,SAFELINER: Brand: DEROYAL

## (undated) DEVICE — BW-412T DISP COMBO CLEANING BRUSH: Brand: SINGLE USE COMBINATION CLEANING BRUSH

## (undated) DEVICE — ADAPT SWVL FIBROPTIC BRONCH

## (undated) DEVICE — SPNG GZ WOVN 4X4IN 12PLY 10/BX STRL

## (undated) DEVICE — SINGLE USE SUCTION VALVE MAJ-209: Brand: SINGLE USE SUCTION VALVE (STERILE)

## (undated) DEVICE — SENSR O2 OXIMAX FNGR A/ 18IN NONSTR

## (undated) DEVICE — MASK,FACE,FLUID RES,SHLD,FOGFREE,TIES: Brand: MEDLINE

## (undated) DEVICE — LN SMPL O2 NASL/ORL SMART/CAPNOLINE PLS A/

## (undated) DEVICE — TBG 02 CRUSH RESIST LF CLR 7FT

## (undated) DEVICE — VIAL FORMALIN CAP 10P 40ML

## (undated) DEVICE — JACKT LAB KNIT COLR LG BLU

## (undated) DEVICE — ADAPT CLN BIOGUARD AIR/H2O DISP

## (undated) DEVICE — MASK,OXY,ADLT,NON-REB,SAFETY VENT,7,UC: Brand: MEDLINE

## (undated) DEVICE — MSK AIRWY LARYNG LMA UNIQUE STD PK SZ4

## (undated) DEVICE — THE BITE BLOCK MAXI, LATEX FREE STRAP IS USED TO PROTECT THE ENDOSCOPE INSERTION TUBE FROM BEING BITTEN BY THE PATIENT.

## (undated) DEVICE — BITEBLOCK OMNI BLOC

## (undated) DEVICE — GLV SURG SENSICARE MICRO PF LF 6 STRL